# Patient Record
Sex: FEMALE | Race: WHITE | Employment: FULL TIME | URBAN - METROPOLITAN AREA
[De-identification: names, ages, dates, MRNs, and addresses within clinical notes are randomized per-mention and may not be internally consistent; named-entity substitution may affect disease eponyms.]

---

## 2022-08-22 ENCOUNTER — OFFICE VISIT (OUTPATIENT)
Dept: PRIMARY CARE CLINIC | Age: 47
End: 2022-08-22
Payer: MEDICAID

## 2022-08-22 VITALS
HEIGHT: 62 IN | DIASTOLIC BLOOD PRESSURE: 64 MMHG | WEIGHT: 193 LBS | TEMPERATURE: 97.1 F | SYSTOLIC BLOOD PRESSURE: 108 MMHG | OXYGEN SATURATION: 97 % | HEART RATE: 84 BPM | BODY MASS INDEX: 35.51 KG/M2

## 2022-08-22 DIAGNOSIS — G89.29 GROIN PAIN, CHRONIC, LEFT: ICD-10-CM

## 2022-08-22 DIAGNOSIS — Z76.89 ENCOUNTER TO ESTABLISH CARE: Primary | ICD-10-CM

## 2022-08-22 DIAGNOSIS — M47.26 OTHER SPONDYLOSIS WITH RADICULOPATHY, LUMBAR REGION: ICD-10-CM

## 2022-08-22 DIAGNOSIS — R10.32 GROIN PAIN, CHRONIC, LEFT: ICD-10-CM

## 2022-08-22 DIAGNOSIS — N95.1 MENOPAUSAL SYMPTOMS: ICD-10-CM

## 2022-08-22 DIAGNOSIS — G43.909 MIGRAINE WITHOUT STATUS MIGRAINOSUS, NOT INTRACTABLE, UNSPECIFIED MIGRAINE TYPE: ICD-10-CM

## 2022-08-22 DIAGNOSIS — Z11.59 ENCOUNTER FOR HEPATITIS C SCREENING TEST FOR LOW RISK PATIENT: ICD-10-CM

## 2022-08-22 DIAGNOSIS — Z13.220 LIPID SCREENING: ICD-10-CM

## 2022-08-22 PROCEDURE — 99204 OFFICE O/P NEW MOD 45 MIN: CPT | Performed by: INTERNAL MEDICINE

## 2022-08-22 SDOH — ECONOMIC STABILITY: FOOD INSECURITY: WITHIN THE PAST 12 MONTHS, YOU WORRIED THAT YOUR FOOD WOULD RUN OUT BEFORE YOU GOT MONEY TO BUY MORE.: NEVER TRUE

## 2022-08-22 SDOH — ECONOMIC STABILITY: FOOD INSECURITY: WITHIN THE PAST 12 MONTHS, THE FOOD YOU BOUGHT JUST DIDN'T LAST AND YOU DIDN'T HAVE MONEY TO GET MORE.: NEVER TRUE

## 2022-08-22 ASSESSMENT — PATIENT HEALTH QUESTIONNAIRE - PHQ9
DEPRESSION UNABLE TO ASSESS: PT REFUSES
2. FEELING DOWN, DEPRESSED OR HOPELESS: 0
1. LITTLE INTEREST OR PLEASURE IN DOING THINGS: 0
SUM OF ALL RESPONSES TO PHQ9 QUESTIONS 1 & 2: 0
SUM OF ALL RESPONSES TO PHQ QUESTIONS 1-9: 0

## 2022-08-22 ASSESSMENT — ENCOUNTER SYMPTOMS
SHORTNESS OF BREATH: 0
CHEST TIGHTNESS: 0
COUGH: 0
WHEEZING: 0
NAUSEA: 0
VOMITING: 0

## 2022-08-22 ASSESSMENT — SOCIAL DETERMINANTS OF HEALTH (SDOH): HOW HARD IS IT FOR YOU TO PAY FOR THE VERY BASICS LIKE FOOD, HOUSING, MEDICAL CARE, AND HEATING?: NOT HARD AT ALL
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
## 2022-09-20 DIAGNOSIS — N95.1 MENOPAUSAL SYMPTOMS: ICD-10-CM

## 2022-09-20 DIAGNOSIS — Z11.59 ENCOUNTER FOR HEPATITIS C SCREENING TEST FOR LOW RISK PATIENT: ICD-10-CM

## 2022-09-20 DIAGNOSIS — Z76.89 ENCOUNTER TO ESTABLISH CARE: ICD-10-CM

## 2022-09-20 DIAGNOSIS — Z13.220 LIPID SCREENING: ICD-10-CM

## 2022-09-20 LAB
ALBUMIN SERPL-MCNC: 4.3 G/DL (ref 3.5–4.6)
ALP BLD-CCNC: 70 U/L (ref 40–130)
ALT SERPL-CCNC: 14 U/L (ref 0–33)
ANION GAP SERPL CALCULATED.3IONS-SCNC: 9 MEQ/L (ref 9–15)
AST SERPL-CCNC: 14 U/L (ref 0–35)
BASOPHILS ABSOLUTE: 0 K/UL (ref 0–0.2)
BASOPHILS RELATIVE PERCENT: 0.5 %
BILIRUB SERPL-MCNC: <0.2 MG/DL (ref 0.2–0.7)
BUN BLDV-MCNC: 19 MG/DL (ref 6–20)
CALCIUM SERPL-MCNC: 8.9 MG/DL (ref 8.5–9.9)
CHLORIDE BLD-SCNC: 107 MEQ/L (ref 95–107)
CHOLESTEROL, TOTAL: 211 MG/DL (ref 0–199)
CO2: 26 MEQ/L (ref 20–31)
CREAT SERPL-MCNC: 0.81 MG/DL (ref 0.5–0.9)
EOSINOPHILS ABSOLUTE: 0.2 K/UL (ref 0–0.7)
EOSINOPHILS RELATIVE PERCENT: 2.7 %
FOLLICLE STIMULATING HORMONE: 84.7 MIU/ML (ref 1.7–21.5)
GFR AFRICAN AMERICAN: >60
GFR NON-AFRICAN AMERICAN: >60
GLOBULIN: 2.9 G/DL (ref 2.3–3.5)
GLUCOSE BLD-MCNC: 103 MG/DL (ref 70–99)
HCT VFR BLD CALC: 36.7 % (ref 37–47)
HDLC SERPL-MCNC: 46 MG/DL (ref 40–59)
HEMOGLOBIN: 12.3 G/DL (ref 12–16)
HEPATITIS C ANTIBODY: NONREACTIVE
LDL CHOLESTEROL CALCULATED: 134 MG/DL (ref 0–129)
LYMPHOCYTES ABSOLUTE: 2.5 K/UL (ref 1–4.8)
LYMPHOCYTES RELATIVE PERCENT: 28 %
MCH RBC QN AUTO: 30.5 PG (ref 27–31.3)
MCHC RBC AUTO-ENTMCNC: 33.5 % (ref 33–37)
MCV RBC AUTO: 91.1 FL (ref 82–100)
MONOCYTES ABSOLUTE: 0.5 K/UL (ref 0.2–0.8)
MONOCYTES RELATIVE PERCENT: 5.6 %
NEUTROPHILS ABSOLUTE: 5.5 K/UL (ref 1.4–6.5)
NEUTROPHILS RELATIVE PERCENT: 63.2 %
PDW BLD-RTO: 13.9 % (ref 11.5–14.5)
PLATELET # BLD: 231 K/UL (ref 130–400)
POTASSIUM SERPL-SCNC: 4.6 MEQ/L (ref 3.4–4.9)
RBC # BLD: 4.03 M/UL (ref 4.2–5.4)
SODIUM BLD-SCNC: 142 MEQ/L (ref 135–144)
TOTAL PROTEIN: 7.2 G/DL (ref 6.3–8)
TRIGL SERPL-MCNC: 154 MG/DL (ref 0–150)
WBC # BLD: 8.8 K/UL (ref 4.8–10.8)

## 2022-10-10 ENCOUNTER — TELEMEDICINE (OUTPATIENT)
Dept: PRIMARY CARE CLINIC | Age: 47
End: 2022-10-10
Payer: MEDICAID

## 2022-10-10 DIAGNOSIS — N95.1 VASOMOTOR SYMPTOMS DUE TO MENOPAUSE: Primary | ICD-10-CM

## 2022-10-10 PROCEDURE — 99423 OL DIG E/M SVC 21+ MIN: CPT | Performed by: INTERNAL MEDICINE

## 2022-10-10 RX ORDER — HYDROXYZINE PAMOATE 25 MG/1
25 CAPSULE ORAL 3 TIMES DAILY PRN
Qty: 80 CAPSULE | Refills: 2 | Status: SHIPPED | OUTPATIENT
Start: 2022-10-10 | End: 2022-11-09

## 2022-10-10 ASSESSMENT — ENCOUNTER SYMPTOMS
NAUSEA: 0
VOMITING: 0
SHORTNESS OF BREATH: 0
WHEEZING: 0
CHEST TIGHTNESS: 0
COUGH: 0

## 2022-10-10 NOTE — PROGRESS NOTES
Subjective:      Patient ID: Douglas Relily is a 50 y.o. female who presents today for:  Chief Complaint   Patient presents with    Discuss Medications     Estrogen Treatment        HPI    No past medical history on file. Past Surgical History:   Procedure Laterality Date     SECTION      PARTIAL HYSTERECTOMY (CERVIX NOT REMOVED)      TONSILLECTOMY       Family History   Problem Relation Age of Onset    Cancer Mother     Alcohol Abuse Father     High Cholesterol Father     High Blood Pressure Father     Heart Attack Father     Diabetes Father     Diabetes Brother     Diabetes Maternal Aunt     Diabetes Paternal Aunt      No Known Allergies  No current outpatient medications on file prior to visit. No current facility-administered medications on file prior to visit. Review of Systems    Objective: There were no vitals taken for this visit. Physical Exam    Assessment:      There are no diagnoses linked to this encounter. Health Maintenance   Topic Date Due    HIV screen  Never done    Cervical cancer screen  Never done    Diabetes screen  Never done    Colorectal Cancer Screen  Never done    Flu vaccine (1) 2022    Depression Screen  2023    Lipids  2027    DTaP/Tdap/Td vaccine (2 - Td or Tdap) 2028    Pneumococcal 0-64 years Vaccine  Completed    COVID-19 Vaccine  Completed    Hepatitis C screen  Completed    Hepatitis A vaccine  Aged Out    Hib vaccine  Aged Out    Meningococcal (ACWY) vaccine  Aged Out            Plan:      No orders of the defined types were placed in this encounter. No orders of the defined types were placed in this encounter. No follow-ups on file. Patient counseled on treatment rationale and possible medication adverse effects; verbalizes understanding and willing to proceed with care.     Patric Houston MD

## 2022-10-10 NOTE — PROGRESS NOTES
Subjective:      Patient ID: Sophie Adrian is a 50 y.o. female who presents today for:  Chief Complaint   Patient presents with    Discuss Medications     Estrogen Treatment        HPI  Patient being seen to discuss hormonal treatment for menopause. Previous visit on . Experiencing bothersome heat flashes, diffuse itching, weight gain and associated vasomotor symptoms. Of note, patient is s/p MEGAN for uterine fibroids at age 35; has one ovary in-situ (laterality not specified). Patient interested in hormonal treatment. States she was on estrogen some years ago for similar symptoms with good response. No personal history of breast or uterine cancer; no active liver disease. Denies history of blood clots or CVA. Family h/o of breast cancer in second degree relative. Patient is an active smoker, 1/4 pack/day. Counseled on risks associated with smoking and hormonal therapy. Understanding verbalized by patient, she will proceed with treatment. No past medical history on file. Past Surgical History:   Procedure Laterality Date     SECTION      PARTIAL HYSTERECTOMY (CERVIX NOT REMOVED)      TONSILLECTOMY       Family History   Problem Relation Age of Onset    Cancer Mother     Alcohol Abuse Father     High Cholesterol Father     High Blood Pressure Father     Heart Attack Father     Diabetes Father     Diabetes Brother     Diabetes Maternal Aunt     Diabetes Paternal Aunt      No Known Allergies  No current outpatient medications on file prior to visit. No current facility-administered medications on file prior to visit. Review of Systems   Constitutional:  Negative for activity change, chills, diaphoresis, fatigue and fever. Respiratory:  Negative for cough, chest tightness, shortness of breath and wheezing. Cardiovascular:  Negative for chest pain, palpitations and leg swelling. Gastrointestinal:  Negative for nausea and vomiting.    Neurological:  Negative for dizziness, syncope, light-headedness and headaches. Objective: There were no vitals taken for this visit. Physical Exam  N/A- Audio Encounter. Assessment:      Damaris Branham was seen today for discuss medications. Diagnoses and all orders for this visit:    Vasomotor symptoms due to menopause        -     Counseled at length on possible adverse effects and contraindications to HRT. Understanding verbalized, will proceed with treatment. -     estradiol (CLIMARA) 0.025 MG/24HR; Place 1 patch onto the skin every 7 days  -     hydrOXYzine pamoate (VISTARIL) 25 MG capsule; Take 1 capsule by mouth 3 times daily as needed for Itching or Anxiety      Health Maintenance   Topic Date Due    HIV screen  Never done    Cervical cancer screen  Never done    Diabetes screen  Never done    Colorectal Cancer Screen  Never done    Flu vaccine (1) 08/01/2022    Depression Screen  08/22/2023    Lipids  09/20/2027    DTaP/Tdap/Td vaccine (2 - Td or Tdap) 05/18/2028    Pneumococcal 0-64 years Vaccine  Completed    COVID-19 Vaccine  Completed    Hepatitis C screen  Completed    Hepatitis A vaccine  Aged Out    Hib vaccine  Aged Out    Meningococcal (ACWY) vaccine  Aged Out            Plan:    As above. No orders of the defined types were placed in this encounter. Orders Placed This Encounter   Medications    estradiol (CLIMARA) 0.025 MG/24HR     Sig: Place 1 patch onto the skin every 7 days     Dispense:  4 patch     Refill:  3    hydrOXYzine pamoate (VISTARIL) 25 MG capsule     Sig: Take 1 capsule by mouth 3 times daily as needed for Itching or Anxiety     Dispense:  80 capsule     Refill:  2       No follow-ups on file. On this date 10/10/2022 I have spent 30 minutes reviewing previous notes, test results and conducting a virtual visit with the patient discussing the diagnosis and importance of compliance with the treatment plan as well as documenting on the day of the visit.     Aman Allen was evaluated through a synchronous (real-time) audio encounter. The patient (or guardian if applicable) is aware that this is a billable service. Verbal consent to proceed has been obtained within the past 12 months. The visit was conducted pursuant to the emergency declaration under the [address] [place] [address] authority and the [employer] Act. Patient identification was verified, and a caregiver was present when appropriate. The patient was located in a state where the provider was credentialed to provide care.     Lisa Boateng MD

## 2023-10-12 ENCOUNTER — HOSPITAL ENCOUNTER (EMERGENCY)
Age: 48
Discharge: HOME | End: 2023-10-12

## 2023-10-12 ENCOUNTER — APPOINTMENT (OUTPATIENT)
Dept: CT IMAGING | Age: 48
End: 2023-10-12

## 2023-10-12 ENCOUNTER — APPOINTMENT (OUTPATIENT)
Dept: GENERAL RADIOLOGY | Age: 48
End: 2023-10-12

## 2023-10-12 VITALS
SYSTOLIC BLOOD PRESSURE: 111 MMHG | WEIGHT: 162 LBS | OXYGEN SATURATION: 99 % | HEIGHT: 61 IN | RESPIRATION RATE: 16 BRPM | DIASTOLIC BLOOD PRESSURE: 71 MMHG | TEMPERATURE: 97.8 F | BODY MASS INDEX: 30.58 KG/M2 | HEART RATE: 71 BPM

## 2023-10-12 DIAGNOSIS — R10.9 FLANK PAIN, ACUTE: Primary | ICD-10-CM

## 2023-10-12 LAB
ALBUMIN SERPL-MCNC: 4.6 G/DL (ref 3.5–4.6)
ALP SERPL-CCNC: 66 U/L (ref 40–130)
ALT SERPL-CCNC: 9 U/L (ref 0–33)
ANION GAP SERPL CALCULATED.3IONS-SCNC: 8 MEQ/L (ref 9–15)
AST SERPL-CCNC: 14 U/L (ref 0–35)
BACTERIA URNS QL MICRO: ABNORMAL /HPF
BASOPHILS # BLD: 0 K/UL (ref 0–0.2)
BASOPHILS NFR BLD: 0.4 %
BILIRUB SERPL-MCNC: 0.5 MG/DL (ref 0.2–0.7)
BILIRUB UR QL STRIP: NEGATIVE
BUN SERPL-MCNC: 17 MG/DL (ref 6–20)
CALCIUM SERPL-MCNC: 9.4 MG/DL (ref 8.5–9.9)
CHLORIDE SERPL-SCNC: 102 MEQ/L (ref 95–107)
CLARITY UR: ABNORMAL
CO2 SERPL-SCNC: 29 MEQ/L (ref 20–31)
COLOR UR: YELLOW
CREAT SERPL-MCNC: 0.84 MG/DL (ref 0.5–0.9)
EOSINOPHIL # BLD: 0.2 K/UL (ref 0–0.7)
EOSINOPHIL NFR BLD: 1.9 %
EPI CELLS #/AREA URNS AUTO: >100 /HPF (ref 0–5)
ERYTHROCYTE [DISTWIDTH] IN BLOOD BY AUTOMATED COUNT: 13.5 % (ref 11.5–14.5)
GLOBULIN SER CALC-MCNC: 2.7 G/DL (ref 2.3–3.5)
GLUCOSE SERPL-MCNC: 82 MG/DL (ref 70–99)
GLUCOSE UR STRIP-MCNC: NEGATIVE MG/DL
HCT VFR BLD AUTO: 41.2 % (ref 37–47)
HGB BLD-MCNC: 13.2 G/DL (ref 12–16)
HGB UR QL STRIP: NEGATIVE
HYALINE CASTS #/AREA URNS AUTO: ABNORMAL /HPF (ref 0–5)
KETONES UR STRIP-MCNC: NEGATIVE MG/DL
LACTATE BLDV-SCNC: 0.8 MMOL/L (ref 0.5–2.2)
LEUKOCYTE ESTERASE UR QL STRIP: ABNORMAL
LYMPHOCYTES # BLD: 3.7 K/UL (ref 1–4.8)
LYMPHOCYTES NFR BLD: 35.2 %
MCH RBC QN AUTO: 30.2 PG (ref 27–31.3)
MCHC RBC AUTO-ENTMCNC: 32 % (ref 33–37)
MCV RBC AUTO: 94.3 FL (ref 79.4–94.8)
MONOCYTES # BLD: 0.7 K/UL (ref 0.2–0.8)
MONOCYTES NFR BLD: 6.8 %
NEUTROPHILS # BLD: 5.8 K/UL (ref 1.4–6.5)
NEUTS SEG NFR BLD: 55.4 %
NITRITE UR QL STRIP: NEGATIVE
PH UR STRIP: 6.5 [PH] (ref 5–9)
PLATELET # BLD AUTO: 272 K/UL (ref 130–400)
POTASSIUM SERPL-SCNC: 4.3 MEQ/L (ref 3.4–4.9)
PROT SERPL-MCNC: 7.3 G/DL (ref 6.3–8)
PROT UR STRIP-MCNC: NEGATIVE MG/DL
RBC # BLD AUTO: 4.37 M/UL (ref 4.2–5.4)
RBC #/AREA URNS AUTO: ABNORMAL /HPF (ref 0–5)
SODIUM SERPL-SCNC: 139 MEQ/L (ref 135–144)
SP GR UR STRIP: 1.02 (ref 1–1.03)
URINE REFLEX TO CULTURE: ABNORMAL
UROBILINOGEN UR STRIP-ACNC: 0.2 E.U./DL
WBC # BLD AUTO: 10.5 K/UL (ref 4.8–10.8)
WBC #/AREA URNS AUTO: ABNORMAL /HPF (ref 0–5)

## 2023-10-12 PROCEDURE — 99284 EMERGENCY DEPT VISIT MOD MDM: CPT

## 2023-10-12 PROCEDURE — 36415 COLL VENOUS BLD VENIPUNCTURE: CPT

## 2023-10-12 PROCEDURE — 71046 X-RAY EXAM CHEST 2 VIEWS: CPT

## 2023-10-12 PROCEDURE — 85025 COMPLETE CBC W/AUTO DIFF WBC: CPT

## 2023-10-12 PROCEDURE — 2580000003 HC RX 258

## 2023-10-12 PROCEDURE — 74150 CT ABDOMEN W/O CONTRAST: CPT

## 2023-10-12 PROCEDURE — 80053 COMPREHEN METABOLIC PANEL: CPT

## 2023-10-12 PROCEDURE — 83605 ASSAY OF LACTIC ACID: CPT

## 2023-10-12 PROCEDURE — 81003 URINALYSIS AUTO W/O SCOPE: CPT

## 2023-10-12 PROCEDURE — 96374 THER/PROPH/DIAG INJ IV PUSH: CPT

## 2023-10-12 PROCEDURE — 6360000002 HC RX W HCPCS

## 2023-10-12 RX ORDER — TIZANIDINE 4 MG/1
4 TABLET ORAL EVERY 8 HOURS PRN
Qty: 15 TABLET | Refills: 0 | Status: SHIPPED | OUTPATIENT
Start: 2023-10-12

## 2023-10-12 RX ORDER — KETOROLAC TROMETHAMINE 15 MG/ML
30 INJECTION, SOLUTION INTRAMUSCULAR; INTRAVENOUS ONCE
Status: COMPLETED | OUTPATIENT
Start: 2023-10-12 | End: 2023-10-12

## 2023-10-12 RX ORDER — 0.9 % SODIUM CHLORIDE 0.9 %
1000 INTRAVENOUS SOLUTION INTRAVENOUS ONCE
Status: COMPLETED | OUTPATIENT
Start: 2023-10-12 | End: 2023-10-12

## 2023-10-12 RX ORDER — ETODOLAC 400 MG/1
400 TABLET, FILM COATED ORAL 2 TIMES DAILY
Qty: 14 TABLET | Refills: 0 | Status: SHIPPED | OUTPATIENT
Start: 2023-10-12

## 2023-10-12 RX ADMIN — KETOROLAC TROMETHAMINE 30 MG: 15 INJECTION, SOLUTION INTRAMUSCULAR; INTRAVENOUS at 17:26

## 2023-10-12 RX ADMIN — SODIUM CHLORIDE 1000 ML: 9 INJECTION, SOLUTION INTRAVENOUS at 17:25

## 2023-10-12 ASSESSMENT — PATIENT HEALTH QUESTIONNAIRE - PHQ9
SUM OF ALL RESPONSES TO PHQ9 QUESTIONS 1 & 2: 0
SUM OF ALL RESPONSES TO PHQ QUESTIONS 1-9: 0
2. FEELING DOWN, DEPRESSED OR HOPELESS: 0
SUM OF ALL RESPONSES TO PHQ QUESTIONS 1-9: 0
1. LITTLE INTEREST OR PLEASURE IN DOING THINGS: 0
SUM OF ALL RESPONSES TO PHQ QUESTIONS 1-9: 0
SUM OF ALL RESPONSES TO PHQ QUESTIONS 1-9: 0

## 2023-10-12 ASSESSMENT — PAIN - FUNCTIONAL ASSESSMENT
PAIN_FUNCTIONAL_ASSESSMENT: 0-10
PAIN_FUNCTIONAL_ASSESSMENT: ACTIVITIES ARE NOT PREVENTED
PAIN_FUNCTIONAL_ASSESSMENT: 0-10
PAIN_FUNCTIONAL_ASSESSMENT: NONE - DENIES PAIN

## 2023-10-12 ASSESSMENT — ENCOUNTER SYMPTOMS
CONSTIPATION: 0
NAUSEA: 0
SHORTNESS OF BREATH: 0
VOMITING: 0
ABDOMINAL PAIN: 0
COUGH: 0
DIARRHEA: 0
BACK PAIN: 1

## 2023-10-12 ASSESSMENT — PAIN DESCRIPTION - ONSET: ONSET: ON-GOING

## 2023-10-12 ASSESSMENT — LIFESTYLE VARIABLES
HOW OFTEN DO YOU HAVE A DRINK CONTAINING ALCOHOL: NEVER
HOW MANY STANDARD DRINKS CONTAINING ALCOHOL DO YOU HAVE ON A TYPICAL DAY: PATIENT DOES NOT DRINK

## 2023-10-12 ASSESSMENT — PAIN DESCRIPTION - FREQUENCY: FREQUENCY: INTERMITTENT

## 2023-10-12 ASSESSMENT — PAIN DESCRIPTION - PAIN TYPE: TYPE: ACUTE PAIN

## 2023-10-12 ASSESSMENT — PAIN DESCRIPTION - ORIENTATION: ORIENTATION: LEFT

## 2023-10-12 ASSESSMENT — PAIN DESCRIPTION - LOCATION: LOCATION: BACK

## 2023-10-12 ASSESSMENT — PAIN DESCRIPTION - DESCRIPTORS: DESCRIPTORS: ACHING;STABBING

## 2023-10-12 ASSESSMENT — PAIN SCALES - GENERAL
PAINLEVEL_OUTOF10: 3
PAINLEVEL_OUTOF10: 6

## 2023-10-12 NOTE — ED PROVIDER NOTES
[facility] ED  eMERGENCYdEPARTMENT eNCOUnter        Pt Name: Maia Boland  MRN: [id number]  [address] 1975[facility] evaluation: 10/12/2023  Rashad Herndon PA-C    CHIEF COMPLAINT       Chief Complaint   Patient presents with    Back Pain     Radiating to upper L ABD   Stabbing pain intermittent   Denies any SOB   Pt denies any cough          HISTORY OF PRESENT ILLNESS  (Location/Symptom, Timing/Onset, Context/Setting, Quality, Duration, Modifying Factors, Severity.)   Maia Boland is a 46 y.o. female who presents to the emergency department for evaluation of the left mid back pain that radiates to her left ribs. Patient states that the pain began 6 days ago and has been getting worse since then. She describes the pain as constant stabbing in her left mid back that intermittently \"jolts\" the sharp pain to her left lower ribs. She states that when the \" jolts\" have been the pain is a 7 out of 10. She has tried ibuprofen at home with only minimal improvement. Denies any rashes, fevers, chest pain, shortness of breath, abdominal pain, nausea, vomiting. HPI    Nursing Notes were reviewed and I agree. REVIEW OF SYSTEMS    (2-9 systems for level 4, 10 or more for level 5)     Review of Systems   Constitutional:  Negative for fever. HENT:  Negative for congestion. Respiratory:  Negative for cough and shortness of breath. Cardiovascular:  Negative for chest pain. Gastrointestinal:  Negative for abdominal pain, constipation, diarrhea, nausea and vomiting. Genitourinary:  Negative for dysuria. Musculoskeletal:  Positive for back pain. Skin:  Negative for rash and wound. as noted above the remainder of the review of systems was reviewed and negative. PAST MEDICAL HISTORY   No past medical history on file.       SURGICAL HISTORY       Past Surgical History:   Procedure Laterality Date     SECTION      PARTIAL HYSTERECTOMY (CERVIX NOT REMOVED)      TONSILLECTOMY

## 2023-10-12 NOTE — ED TRIAGE NOTES
Pt arrived with mid left back pain that was tender last Saturday  Pt states that stabbing pain that radiated to left upper abd/rib area  Stabbing pain comes and goes   Denies any SOB   Pt denies any cough   Pt denies any injury to area

## 2023-10-12 NOTE — ED NOTES
Patient resting comfortably with eyes closed. VSS. Respirations even and unlabored.      Paulette Beck RN  10/12/23 1948

## 2024-02-06 ENCOUNTER — APPOINTMENT (OUTPATIENT)
Dept: PRIMARY CARE | Facility: CLINIC | Age: 49
End: 2024-02-06
Payer: MEDICARE

## 2024-02-07 PROBLEM — E78.2 MIXED HYPERLIPIDEMIA: Status: ACTIVE | Noted: 2019-12-12

## 2024-02-07 PROBLEM — F41.1 GENERALIZED ANXIETY DISORDER: Status: ACTIVE | Noted: 2024-02-07

## 2024-02-13 ENCOUNTER — OFFICE VISIT (OUTPATIENT)
Dept: PRIMARY CARE | Facility: CLINIC | Age: 49
End: 2024-02-13
Payer: MEDICARE

## 2024-02-13 ENCOUNTER — APPOINTMENT (OUTPATIENT)
Dept: PRIMARY CARE | Facility: CLINIC | Age: 49
End: 2024-02-13
Payer: MEDICARE

## 2024-02-13 ENCOUNTER — HOSPITAL ENCOUNTER (OUTPATIENT)
Dept: RADIOLOGY | Facility: CLINIC | Age: 49
Discharge: HOME | End: 2024-02-13
Payer: MEDICARE

## 2024-02-13 VITALS
DIASTOLIC BLOOD PRESSURE: 60 MMHG | HEART RATE: 71 BPM | TEMPERATURE: 97.5 F | WEIGHT: 175 LBS | BODY MASS INDEX: 33.04 KG/M2 | OXYGEN SATURATION: 99 % | SYSTOLIC BLOOD PRESSURE: 116 MMHG | RESPIRATION RATE: 20 BRPM | HEIGHT: 61 IN

## 2024-02-13 DIAGNOSIS — G89.29 CHRONIC LEFT HIP PAIN: ICD-10-CM

## 2024-02-13 DIAGNOSIS — F17.200 TOBACCO USE DISORDER: ICD-10-CM

## 2024-02-13 DIAGNOSIS — M25.552 CHRONIC LEFT HIP PAIN: ICD-10-CM

## 2024-02-13 DIAGNOSIS — Z12.31 ENCOUNTER FOR SCREENING MAMMOGRAM FOR BREAST CANCER: ICD-10-CM

## 2024-02-13 DIAGNOSIS — E78.2 MIXED HYPERLIPIDEMIA: ICD-10-CM

## 2024-02-13 DIAGNOSIS — Z00.00 ANNUAL PHYSICAL EXAM: Primary | ICD-10-CM

## 2024-02-13 DIAGNOSIS — M62.838 MUSCLE SPASM: ICD-10-CM

## 2024-02-13 DIAGNOSIS — N95.1 MENOPAUSAL SYNDROME (HOT FLASHES): ICD-10-CM

## 2024-02-13 PROCEDURE — 4004F PT TOBACCO SCREEN RCVD TLK: CPT | Performed by: PHYSICIAN ASSISTANT

## 2024-02-13 PROCEDURE — 73502 X-RAY EXAM HIP UNI 2-3 VIEWS: CPT | Mod: LEFT SIDE | Performed by: RADIOLOGY

## 2024-02-13 PROCEDURE — 99386 PREV VISIT NEW AGE 40-64: CPT | Performed by: PHYSICIAN ASSISTANT

## 2024-02-13 PROCEDURE — 73502 X-RAY EXAM HIP UNI 2-3 VIEWS: CPT | Mod: LT

## 2024-02-13 RX ORDER — IBUPROFEN 200 MG
800 TABLET ORAL DAILY
COMMUNITY

## 2024-02-13 RX ORDER — ACETAMINOPHEN 500 MG
TABLET ORAL
COMMUNITY

## 2024-02-13 RX ORDER — GLUCOSAM/CHONDRO/HERB 149/HYAL 750-100 MG
TABLET ORAL DAILY
COMMUNITY

## 2024-02-13 RX ORDER — ESTRADIOL 1 MG/1
1 TABLET ORAL DAILY
Qty: 30 TABLET | Refills: 1 | Status: SHIPPED | OUTPATIENT
Start: 2024-02-13 | End: 2024-04-16

## 2024-02-13 RX ORDER — TIZANIDINE 4 MG/1
4 TABLET ORAL EVERY 8 HOURS PRN
Qty: 30 TABLET | Refills: 1 | Status: SHIPPED | OUTPATIENT
Start: 2024-02-13

## 2024-02-13 RX ORDER — MELOXICAM 15 MG/1
15 TABLET ORAL DAILY
Qty: 90 TABLET | Refills: 1 | Status: SHIPPED | OUTPATIENT
Start: 2024-02-13

## 2024-02-13 ASSESSMENT — ENCOUNTER SYMPTOMS: ARTHRALGIAS: 1

## 2024-02-13 NOTE — ASSESSMENT & PLAN NOTE
- Educated about risks of smoking e.g. COPD, cancer, MI   - Recommend smoking cessation   - Discussed tx options - pt declined   - Pt is trying to quit on her own and is tapering down. She has successfully quit in the past

## 2024-02-13 NOTE — ASSESSMENT & PLAN NOTE
PREVENTIVE CARE SCREENING:  - Labs: some due - ordered   - Cologuard/ Colonoscopy: UTD   Vaccines:   - Flu: UTD  - Shingles Vaccine (start at 50): DUE, DECLINED  - Tetanus (q10yrs): UTD  - COVID-19: Pfizer x 3 in 2021, DECLINED booster  Women's health:  - PAP: n/a hyst   - Mammogram: DUE, ordered today   Lifestyle Modification:  - Discussed DIET -   limit snacks, processed foods, sugary and greasy foods, fast foods. Increase healthy alternatives, whole grains, fruits vegetables.  - Encouraged to take daily multivitamin.    - Discussed EXERCISE -   Recommended weight training for bone health and 30 minutes of cardiovascular exercise 5-7 days a week.  - Encouraged pt to get yearly eye and dental exams
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
## 2024-02-15 ENCOUNTER — HOSPITAL ENCOUNTER (OUTPATIENT)
Dept: RADIOLOGY | Facility: CLINIC | Age: 49
Discharge: HOME | End: 2024-02-15
Payer: MEDICARE

## 2024-02-15 DIAGNOSIS — Z12.31 ENCOUNTER FOR SCREENING MAMMOGRAM FOR BREAST CANCER: ICD-10-CM

## 2024-02-15 DIAGNOSIS — M16.12 ARTHRITIS OF LEFT HIP: Primary | ICD-10-CM

## 2024-02-15 PROCEDURE — 77067 SCR MAMMO BI INCL CAD: CPT | Performed by: RADIOLOGY

## 2024-02-15 PROCEDURE — 77067 SCR MAMMO BI INCL CAD: CPT

## 2024-02-15 PROCEDURE — 77063 BREAST TOMOSYNTHESIS BI: CPT | Performed by: RADIOLOGY

## 2024-02-16 ENCOUNTER — HOSPITAL ENCOUNTER (OUTPATIENT)
Dept: RADIOLOGY | Facility: EXTERNAL LOCATION | Age: 49
Discharge: HOME | End: 2024-02-16

## 2024-02-26 ENCOUNTER — LAB (OUTPATIENT)
Dept: LAB | Facility: LAB | Age: 49
End: 2024-02-26
Payer: MEDICARE

## 2024-02-26 DIAGNOSIS — Z00.00 ANNUAL PHYSICAL EXAM: ICD-10-CM

## 2024-02-26 DIAGNOSIS — E78.2 MIXED HYPERLIPIDEMIA: ICD-10-CM

## 2024-02-26 LAB
CHOLEST SERPL-MCNC: 221 MG/DL (ref 0–199)
CHOLESTEROL/HDL RATIO: 3.9
EST. AVERAGE GLUCOSE BLD GHB EST-MCNC: 100 MG/DL
HBA1C MFR BLD: 5.1 %
HDLC SERPL-MCNC: 56 MG/DL
LDLC SERPL CALC-MCNC: 143 MG/DL
NON HDL CHOLESTEROL: 165 MG/DL (ref 0–149)
TRIGL SERPL-MCNC: 110 MG/DL (ref 0–149)
VLDL: 22 MG/DL (ref 0–40)

## 2024-02-26 PROCEDURE — 83036 HEMOGLOBIN GLYCOSYLATED A1C: CPT

## 2024-02-26 PROCEDURE — 80061 LIPID PANEL: CPT

## 2024-02-26 PROCEDURE — 36415 COLL VENOUS BLD VENIPUNCTURE: CPT

## 2024-02-27 ENCOUNTER — OFFICE VISIT (OUTPATIENT)
Dept: ORTHOPEDIC SURGERY | Facility: CLINIC | Age: 49
End: 2024-02-27
Payer: MEDICARE

## 2024-02-27 DIAGNOSIS — M16.12 ARTHRITIS OF LEFT HIP: ICD-10-CM

## 2024-02-27 PROCEDURE — 4004F PT TOBACCO SCREEN RCVD TLK: CPT | Performed by: ORTHOPAEDIC SURGERY

## 2024-02-27 PROCEDURE — 99203 OFFICE O/P NEW LOW 30 MIN: CPT | Performed by: ORTHOPAEDIC SURGERY

## 2024-02-27 ASSESSMENT — PAIN SCALES - GENERAL: PAINLEVEL_OUTOF10: 1

## 2024-02-27 ASSESSMENT — PAIN - FUNCTIONAL ASSESSMENT: PAIN_FUNCTIONAL_ASSESSMENT: 0-10

## 2024-02-27 NOTE — PROGRESS NOTES
History of Present Illness  No chief complaint on file.      Patient presents with side: left hip pain for several years.  It has been worsening over the past  3 months.  The patient localizes the pain predominantly in the groin.  Recently there has been concern for falls and instability.  There is increasing difficulty with activities of daily living and significant disability related to the hip pain.  The patient endorses the following failed non-operative treatments:  Mobic which is helping some .   There is increasing frustration with persistent pain and discomfort and decreasing distance of ambulation.    Pain is described as aching, throbbing and sharp.  Better with rest, worse with activity.   Pain level: 7  Assistive device: no device  History of surgery on the hip: None  Back pain: No  Numbness or tingling radiating to the lower extremities: No    No past medical history on file.    Medication Documentation Review Audit       Reviewed by Monse Clements PA-C (Physician Assistant) on 02/13/24 at 1425      Medication Order Taking? Sig Documenting Provider Last Dose Status   cholecalciferol (Vitamin D3) 5,000 Units tablet [id number] Yes Take by mouth 1 (one) time per week. Historical Provider, MD Taking Active   ibuprofen 200 mg tablet [id number] Yes Take 4 tablets (800 mg) by mouth once daily. Historical Provider, MD Taking Active   omega 3-dha-epa-fish oil (Fish OiL) 1,000 mg (120 mg-180 mg) capsule [id number] Yes Take by mouth once daily. Historical Provider, MD Taking Active   soy isofla/blk cohosh/mag bark (ESTROVEN ORAL) [id number] Yes Take by mouth once daily. Historical Provider, MD Taking Active                    No Known Allergies    Social History     Socioeconomic History    Marital status:      Spouse name: Not on file    Number of children: Not on file    Years of education: Not on file    Highest education level: Not on file   Occupational History    Not on file   Tobacco Use    Smoking  status: Every Day     Packs/day: 0.25     Years: 20.00     Additional pack years: 0.00     Total pack years: 5.00     Types: Cigarettes    Smokeless tobacco: Never   Vaping Use    Vaping Use: Never used   Substance and Sexual Activity    Alcohol use: Yes     Comment: rarely    Drug use: Never    Sexual activity: Not on file   Other Topics Concern    Not on file   Social History Narrative    Not on file     Social Determinants of Health     Financial Resource Strain: Not on file   Food Insecurity: Not on file   Transportation Needs: Not on file   Physical Activity: Not on file   Stress: Not on file   Social Connections: Not on file   Intimate Partner Violence: Not on file   Housing Stability: Not on file       Past Surgical History:   Procedure Laterality Date    HYSTERECTOMY      1 ovary left    TONSILLECTOMY      and adenoids       No images are attached to the encounter.    BMI  33       Review of Systems   GENERAL: Negative for malaise, significant weight loss, fever  MUSCULOSKELETAL: see HPI  NEURO:  Negative     Exam  side: left Hip:  Antalgic gait  Negative Trendelenburg sign  Skin healthy and intact  No tenderness to palpation over lumbar spine  Minimal tenderness over greater trochanter     Range of motion:  Flexion: 120 internal rotation: 20 external rotation: 30 abduction: 30  Pain with:  External rotation  No weakness with resisted hip flexion, abduction or adduction     Negative straight leg raise  Neurovascular exam normal distally     Radiographs  XR hip left with pelvis when performed 2 or 3 views  Status: Final result     PACS Images - [id number]     Show images for XR hip left with pelvis when performed 2 or 3 views  Signed by    Signed Time Phone Pager   José Luis Overton MD 2/14/2024 22:57 [phone]     Exam Information    Status Exam Begun Exam Ended   Final 2/13/2024 15:09 2/13/2024 15:14     Study Result    Narrative & Impression   Interpreted By:  José Luis Overton,   STUDY:  XR HIP LEFT WITH  PELVIS WHEN PERFORMED 2 OR 3 VIEWS; ;  2/13/2024  3:14 pm      INDICATION:  Signs/Symptoms:left hip pain.      COMPARISON:  None.      ACCESSION NUMBER(S):  [id number]      ORDERING CLINICIAN:  JESSA PERSAUD      FINDINGS:  No acute displaced fracture. Severe degenerative changes are noted of  the hip. No hip dislocation.      IMPRESSION:  Degenerative changes of the left hip without acute osseous  abnormality.          MACRO:  None      Signed by: José Luis Overton 2/14/2024 10:57 PM  Dictation workstation:   [id number]          Assessment  Osteoarthrosis side: left hip     Plan  We discussed with the patient the diagnosis of severe degenerative joint disease of the hip.  We reviewed an evidence-based approach to osteoarthritis of the hip.  We strongly encouraged low-impact aerobic activity and non-opioid analgesics.  We discussed the role of physical therapy to aid in strengthening and gait training.  We discussed temporary pain relief with corticosteroid injections and the associated risks.      The patient elected for continuing the Mobic and an intra-articular corticosteroid injection.     I will arrange for the injection and see her 6 weeks after the injection to see how this worked  All questions were answered

## 2024-03-07 ENCOUNTER — APPOINTMENT (OUTPATIENT)
Dept: ORTHOPEDIC SURGERY | Facility: CLINIC | Age: 49
End: 2024-03-07
Payer: MEDICARE

## 2024-04-10 ENCOUNTER — OFFICE VISIT (OUTPATIENT)
Dept: ORTHOPEDIC SURGERY | Facility: CLINIC | Age: 49
End: 2024-04-10
Payer: MEDICARE

## 2024-04-10 ENCOUNTER — HOSPITAL ENCOUNTER (OUTPATIENT)
Dept: RADIOLOGY | Facility: EXTERNAL LOCATION | Age: 49
Discharge: HOME | End: 2024-04-10

## 2024-04-10 DIAGNOSIS — M16.12 ARTHRITIS OF LEFT HIP: ICD-10-CM

## 2024-04-10 PROCEDURE — 99214 OFFICE O/P EST MOD 30 MIN: CPT | Performed by: FAMILY MEDICINE

## 2024-04-10 PROCEDURE — 99213 OFFICE O/P EST LOW 20 MIN: CPT | Performed by: FAMILY MEDICINE

## 2024-04-10 PROCEDURE — 2500000004 HC RX 250 GENERAL PHARMACY W/ HCPCS (ALT 636 FOR OP/ED): Performed by: FAMILY MEDICINE

## 2024-04-10 PROCEDURE — 2500000005 HC RX 250 GENERAL PHARMACY W/O HCPCS: Performed by: FAMILY MEDICINE

## 2024-04-10 PROCEDURE — 20611 DRAIN/INJ JOINT/BURSA W/US: CPT | Mod: LT | Performed by: FAMILY MEDICINE

## 2024-04-10 PROCEDURE — 4004F PT TOBACCO SCREEN RCVD TLK: CPT | Performed by: FAMILY MEDICINE

## 2024-04-10 RX ORDER — TRIAMCINOLONE ACETONIDE 40 MG/ML
40 INJECTION, SUSPENSION INTRA-ARTICULAR; INTRAMUSCULAR
Status: COMPLETED | OUTPATIENT
Start: 2024-04-10 | End: 2024-04-10

## 2024-04-10 RX ORDER — LIDOCAINE HYDROCHLORIDE 10 MG/ML
6 INJECTION INFILTRATION; PERINEURAL
Status: COMPLETED | OUTPATIENT
Start: 2024-04-10 | End: 2024-04-10

## 2024-04-10 RX ADMIN — LIDOCAINE HYDROCHLORIDE 6 ML: 10 INJECTION, SOLUTION INFILTRATION; PERINEURAL at 18:13

## 2024-04-10 RX ADMIN — TRIAMCINOLONE ACETONIDE 40 MG: 40 INJECTION, SUSPENSION INTRA-ARTICULAR; INTRAMUSCULAR at 18:13

## 2024-04-10 NOTE — PROGRESS NOTES
Acute Injury New Patient Visit    CC:   Chief Complaint   Patient presents with    Left Hip - Follow-up     Tj patient  Hip injection today       HPI: Nati is a 48 y.o.female who presents today with new complaints of left hip pain.  She has a history of osteoarthritis was recently seen by Dr. Amando Spencer and asked to follow-up with me for an ultrasound injection into her left hip.  She has not had any prior hip injections in the past.  She understands this is not a likely long-term solution and it is likely she is will need to undergo total left hip arthroplasty.        Review of Systems   GENERAL: Negative for malaise, significant weight loss, fever  MUSCULOSKELETAL: See HPI  NEURO: Negative for numbness / tingling     Past Medical History  History reviewed. No pertinent past medical history.    Medication review  Medication Documentation Review Audit       Reviewed by Patricia Shah MA (Medical Assistant) on 04/10/24 at 1358      Medication Order Taking? Sig Documenting Provider Last Dose Status   cholecalciferol (Vitamin D3) 5,000 Units tablet [id number] No Take by mouth 1 (one) time per week. Historical Provider, MD Taking Active   estradiol (Estrace) 1 mg tablet [id number]  Take 1 tablet (1 mg) by mouth once daily. Monse Clements PA-C  Active   ibuprofen 200 mg tablet [id number] No Take 4 tablets (800 mg) by mouth once daily. Historical Provider, MD Taking Active   meloxicam (Mobic) 15 mg tablet [id number]  Take 1 tablet (15 mg) by mouth once daily. Monse Clements PA-C  Active   omega 3-dha-epa-fish oil (Fish OiL) 1,000 mg (120 mg-180 mg) capsule [id number] No Take by mouth once daily. Historical Provider, MD Taking Active   soy isofla/blk cohosh/mag bark (ESTROVEN ORAL) [id number] No Take by mouth once daily. Historical Provider, MD Taking Active   tiZANidine (Zanaflex) 4 mg tablet [id number]  Take 1 tablet (4 mg) by mouth every 8 hours if needed for muscle spasms. Monse Clements PA-C  Active                     Allergies  No Known Allergies    Social History  Social History     Socioeconomic History    Marital status:      Spouse name: Not on file    Number of children: Not on file    Years of education: Not on file    Highest education level: Not on file   Occupational History    Not on file   Tobacco Use    Smoking status: Every Day     Current packs/day: 0.25     Average packs/day: 0.3 packs/day for 20.0 years (5.0 ttl pk-yrs)     Types: Cigarettes    Smokeless tobacco: Never   Vaping Use    Vaping status: Never Used   Substance and Sexual Activity    Alcohol use: Yes     Comment: rarely    Drug use: Never    Sexual activity: Not on file   Other Topics Concern    Not on file   Social History Narrative    Not on file     Social Determinants of Health     Financial Resource Strain: Low Risk  (12/13/2019)    Received from [facility]    Overall Financial Resource Strain (CARDIA)     Difficulty of Paying Living Expenses: Not very hard   Food Insecurity: No Food Insecurity (12/13/2019)    Received from [facility]    Hunger Vital Sign     Worried About Running Out of Food in the Last Year: Never true     Ran Out of Food in the Last Year: Never true   Transportation Needs: No Transportation Needs (12/13/2019)    Received from [facility]    PRAPARE - Transportation     Lack of Transportation (Medical): No     Lack of Transportation (Non-Medical): No   Physical Activity: Insufficiently Active (12/13/2019)    Received from [facility]    Exercise Vital Sign     Days of Exercise per Week: 2 days     Minutes of Exercise per Session: 10 min   Stress: No Stress Concern Present (12/13/2019)    Received from [facility]    [employer] [place] [employer] - Occupational Stress Questionnaire     Feeling of Stress : Only a little   Social Connections: Moderately Integrated (12/13/2019)    Received from [facility]    Social Connection and Isolation Panel [NHANES]     Frequency of  Communication with Friends and Family: More than three times a week     Frequency of Social Gatherings with Friends and Family: Once a week     Attends Mor[employer] Services: Never     Active Member of Clubs or Organizations: Yes     Attends Club or Organization Meetings: More than 4 times per year     Marital Status:    Intimate Partner Violence: Not on file   Housing Stability: Not on file       Surgical History  Past Surgical History:   Procedure Laterality Date    HYSTERECTOMY      1 ovary left    TONSILLECTOMY      and adenoids       Physical Exam:  GENERAL:  Patient is awake, alert, and oriented to person place and time.  Patient appears well nourished and well kept.  Affect Calm, Not Acutely Distressed.  HEENT:  Normocephalic, Atraumatic, EOMI  CARDIOVASCULAR:  Hemodynamically stable.  RESPIRATORY:  Normal respirations with unlabored breathing.  NEURO: Gross sensation intact to the lower extremities bilaterally.  Extremity: Left hip exam demonstrates no redness warmth or erythema pulses and sensation are intact.  No open cuts wounds or sores noted.  Full intact extensor mechanism normal muscle bulk and tone across the anterior aspect of the hip limited internal and external rotation with a negative logroll.      Diagnostics: No new images today        Procedure: US Guided left intra-articular Hip Joint Injection:    Before aspiration/injection, the risks  of this procedure including but not limited to;  infection, local skin irritation, skin atrophy, calcification, continued pain or discomfort, elevated blood sugar, burning, failure to relieve pain, possible late infection were all discussed with the patient.  The patient verbalized understanding and consented to the procedure.    After informed consent was provided, patient identification was confirmed, and allergies were verified, the patient was appropriately positioned. The site was marked and time-out performed.  The patient's [ Left /] Hip Joint was  evaluated via ultrasound in both the short and long axis to identify the intra-articular space.    The injection site was prepped in the usual sterile manner to provide a sterile environment. The skin was anesthetized with ethyl chloride spray. The [ aspiration/injection ] was performed with standard technique with the assistance of direct ultrasound guided visualization from an anterolateral approach. [7] cc´s of injectate consisting of [1] cc´s of [/ Kenalog ] and [6] cc´s of 1% lidocaine without epinephrine was instilled into the joint space.    The 22-guage spinal needle was withdrawn and the puncture site was secured with a Band-Aid. The patient tolerated the procedure well without complication.     Post-procedure discomfort can be alleviated with additional medication, ice, elevation, and rest over the first 24 hours as recommended.    L Inj/Asp: L hip joint on 4/10/2024 6:13 PM  Indications: pain  Details: 22 G needle, ultrasound-guided lateral approach  Medications: 40 mg triamcinolone acetonide 40 mg/mL; 6 mL lidocaine 10 mg/mL (1 %)  Outcome: tolerated well, no immediate complications  Procedure, treatment alternatives, risks and benefits explained, specific risks discussed. Consent was given by the patient. Immediately prior to procedure a time out was called to verify the correct patient, procedure, equipment, support staff and site/side marked as required. Patient was prepped and draped in the usual sterile fashion.           Assessment:   Problem List Items Addressed This Visit    None  Visit Diagnoses       Arthritis of left hip        Relevant Orders    Point of Care Ultrasound (Completed)             Plan: Patient tolerated the injection well.  Will have her follow-up with Dr. Amando Spencer in the next 6 weeks or so.  She is to call or return with any issues in the interim happy to see her back as needed for any potential repeat injection.  We discussed the possibility of 3 lifetime injections for the  arthritis in the left hip prior to requiring arthroplasty.  Orders Placed This Encounter    L Inj/Asp    Point of Care Ultrasound      At the conclusion of the visit there were no further questions by the patient/family regarding their plan of care.  Patient was instructed to call or return with any issues, questions, or concerns regarding their injury and/or treatment plan described above.     04/10/24 at 6:13 PM - Cole C Budinsky, MD    Office: [phone]    This note was prepared using voice recognition software.  The details of this note are correct and have been reviewed, and corrected to the best of my ability.  Some grammatical errors may persist related to the Dragon software.

## 2024-04-16 DIAGNOSIS — N95.1 MENOPAUSAL SYNDROME (HOT FLASHES): ICD-10-CM

## 2024-04-16 RX ORDER — ESTRADIOL 1 MG/1
1 TABLET ORAL DAILY
Qty: 30 TABLET | Refills: 1 | Status: SHIPPED | OUTPATIENT
Start: 2024-04-16

## 2024-05-21 ENCOUNTER — APPOINTMENT (OUTPATIENT)
Dept: ORTHOPEDIC SURGERY | Facility: CLINIC | Age: 49
End: 2024-05-21
Payer: MEDICARE

## 2024-05-29 ENCOUNTER — TELEPHONE (OUTPATIENT)
Dept: ORTHOPEDIC SURGERY | Facility: CLINIC | Age: 49
End: 2024-05-29
Payer: MEDICARE

## 2024-05-29 NOTE — TELEPHONE ENCOUNTER
Patient would like to know if Dr. Spencer can refer her back for another injection , she sees FS 6/4/24

## 2024-05-29 NOTE — LETTER
May 30, 2024     Patient: Nati Arauz   YOB: 1975   Date of Visit: 5/29/2024       To Whom It May Concern:    It is my medical opinion that Nati Arauz  is not able to work at this time.  She may return on 06-05-24  .    If you have any questions or concerns, please don't hesitate to call.         Sincerely,    Amando Spencer MD    CC: No Recipients

## 2024-05-30 NOTE — TELEPHONE ENCOUNTER
Pt calling again to ask for a note to be off work for the next 4 days. Pt stated that she cannot walk without a cane and is scheduled to work 12 hour shifts for the next 4 days. Pt stated that she is scheduled to go in this evening @ 5:45.

## 2024-05-30 NOTE — TELEPHONE ENCOUNTER
Patient was called and she was advised Dr. Spencer will discuss another injection at her appointment on 06-04-24.  We will give her a note to cover her off work from today and ending on 06-05-24.  Patient was advised she needs to keep her appointment pn 06-04-24, as we will not extend beyond 06-04-24 for off work.  She will pick this letter up at the [facility] office today.

## 2024-06-04 ENCOUNTER — OFFICE VISIT (OUTPATIENT)
Dept: ORTHOPEDIC SURGERY | Facility: CLINIC | Age: 49
End: 2024-06-04
Payer: MEDICARE

## 2024-06-04 DIAGNOSIS — M16.12 PRIMARY OSTEOARTHRITIS OF LEFT HIP: Primary | ICD-10-CM

## 2024-06-04 PROCEDURE — 99214 OFFICE O/P EST MOD 30 MIN: CPT | Performed by: ORTHOPAEDIC SURGERY

## 2024-06-04 NOTE — LETTER
June 4, 2024     Patient: Nait Arauz   YOB: 1975   Date of Visit: 6/4/2024       To Whom It May Concern:    It is my medical opinion that Nati Arauz  is to be on the following restrictions :  Limited walking, limited standing, no kneeling , crawling or climbing.  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 These restrictions are in effect through 07-17-24.  She will be undergoing a surgical procedure on 07-17-24. .    If you have any questions or concerns, please don't hesitate to call.         Sincerely,        Amando Spencer MD    CC: No Recipients

## 2024-06-04 NOTE — PROGRESS NOTES
"  No chief complaint on file.      The patient is here for follow-up of their side: left hip arthritis.  They complain of moderate hip pain.  Thus far the patient has tried Rest, ice, elevation, NSAIDS, and Injections.  The patient denies any recent trauma.  The patient denies any back pain, numbness or tingling in the lower extremity.    At the last visit she was sent for an ultrasound-guided intra-articular corticosteroid injection into the left hip.  She notes great relief from the injection however she did a lot of walking and stairs when she was in [place] and states she \"wore off the injection\" she states her pain is worse now than it was before the injection.  She had roughly 7-8 weeks of relief.    No past medical history on file.    Medication Documentation Review Audit       Reviewed by Patricia Shah MA (Medical Assistant) on 04/10/24 at 1358      Medication Order Taking? Sig Documenting Provider Last Dose Status   cholecalciferol (Vitamin D3) 5,000 Units tablet [id number] No Take by mouth 1 (one) time per week. Maria Elena Park MD Taking Active   estradiol (Estrace) 1 mg tablet [id number]  Take 1 tablet (1 mg) by mouth once daily. Monse Clements PA-C  Active   ibuprofen 200 mg tablet [id number] No Take 4 tablets (800 mg) by mouth once daily. Maria Elena Park MD Taking Active   meloxicam (Mobic) 15 mg tablet [id number]  Take 1 tablet (15 mg) by mouth once daily. Monse Clements PA-C  Active   omega 3-dha-epa-fish oil (Fish OiL) 1,000 mg (120 mg-180 mg) capsule [id number] No Take by mouth once daily. Maria Elena Park MD Taking Active   soy isofla/blk cohosh/mag bark (ESTROVEN ORAL) [id number] No Take by mouth once daily. Maria Elena Park MD Taking Active   tiZANidine (Zanaflex) 4 mg tablet [id number]  Take 1 tablet (4 mg) by mouth every 8 hours if needed for muscle spasms. Monse Clements PA-C  Active                    No Known Allergies    Social History     Socioeconomic History    Marital " status:      Spouse name: Not on file    Number of children: Not on file    Years of education: Not on file    Highest education level: Not on file   Occupational History    Not on file   Tobacco Use    Smoking status: Every Day     Current packs/day: 0.25     Average packs/day: 0.3 packs/day for 20.0 years (5.0 ttl pk-yrs)     Types: Cigarettes    Smokeless tobacco: Never   Vaping Use    Vaping status: Never Used   Substance and Sexual Activity    Alcohol use: Yes     Comment: rarely    Drug use: Never    Sexual activity: Not on file   Other Topics Concern    Not on file   Social History Narrative    Not on file     Social Determinants of Health     Financial Resource Strain: Low Risk  (12/13/2019)    Received from [facility]    Overall Financial Resource Strain (CARDIA)     Difficulty of Paying Living Expenses: Not very hard   Food Insecurity: No Food Insecurity (12/13/2019)    Received from [facility]    Hunger Vital Sign     Worried About Running Out of Food in the Last Year: Never true     Ran Out of Food in the Last Year: Never true   Transportation Needs: No Transportation Needs (12/13/2019)    Received from [facility]    PRAPARE - Transportation     Lack of Transportation (Medical): No     Lack of Transportation (Non-Medical): No   Physical Activity: Insufficiently Active (12/13/2019)    Received from [facility]    Exercise Vital Sign     Days of Exercise per Week: 2 days     Minutes of Exercise per Session: 10 min   Stress: No Stress Concern Present (12/13/2019)    Received from [facility]    [employer] [place] [employer] - Occupational Stress Questionnaire     Feeling of Stress : Only a little   Social Connections: Moderately Integrated (12/13/2019)    Received from [facility]    Social Connection and Isolation Panel [NHANES]     Frequency of Communication with Friends and Family: More than three times a week     Frequency of Social Gatherings with  Friends and Family: Once a week     Attends Congregation Services: Never     Active Member of Clubs or Organizations: Yes     Attends Club or Organization Meetings: More than 4 times per year     Marital Status:    Intimate Partner Violence: Not on file   Housing Stability: Not on file       Past Surgical History:   Procedure Laterality Date    HYSTERECTOMY      1 ovary left    TONSILLECTOMY      and adenoids       BMI  33    Physical examination side: left hip:    There is moderate pain with hip flexion, internal and external rotation.  The patient has intact hip flexion and hip abduction.  Hip range of motion:  Flexion: 120  Internal rotation: 20  External rotation: 30  Abduction: 30  The patient is distally neurovascularly intact    Imaging:  Left hip films from February 13, 2024 show severe degenerative arthrosis of the hip.  There is loss of joint space, subchondral sclerosis and spurring.      Impression:  Osteoarthrosis side: left hip    Plan:  The patient has failed to improve with multiple non-operative modalities.  There is increasing difficulty with activities of daily living and concern for falls.  The patient is endorsing severe pain and disability.       We had a lengthy discussion regarding total hip arthroplasty including the orthopaedic risks, including but not limited to, instability, infection, hematoma, early aseptic loosening, neurologic or vascular injury, clicking, limb length inequality and incomplete relief of pain.  We reviewed the medical risks, including but not limited to, deep venous thrombosis, pulmonary embolism, and cardiovascular/pulmonary issues.     We discussed the anticipated longevity of the implants and potential for revision surgery.  We also discussed anticoagulation, rehabilitation goals, and the hospital course.  We discussed the likelihood of opioid analgesics and risks associated with them.  The next step is to obtain medical risk stratification and encouraged the  pre-operative information seminar at the hospital.  We are happy to provide assistance and counseling if the patient has any additional concerns.

## 2024-06-12 ENCOUNTER — TELEPHONE (OUTPATIENT)
Dept: ORTHOPEDIC SURGERY | Facility: CLINIC | Age: 49
End: 2024-06-12
Payer: MEDICARE

## 2024-06-12 NOTE — TELEPHONE ENCOUNTER
Patient called wanting to verify that you received her paperwork. I verified that you did. She also gave me the fax number that once our section of the paperwork is filled out, she said it has to be faxed to [employer] in the HR department of her employer. The fax number is [phone].

## 2024-06-17 ENCOUNTER — TELEPHONE (OUTPATIENT)
Dept: ORTHOPEDIC SURGERY | Facility: CLINIC | Age: 49
End: 2024-06-17
Payer: MEDICARE

## 2024-06-17 PROBLEM — M16.12 UNILATERAL PRIMARY OSTEOARTHRITIS, LEFT HIP: Status: ACTIVE | Noted: 2024-06-14

## 2024-06-17 NOTE — TELEPHONE ENCOUNTER
6/17/24 - wheeled walker for surgery on 7/17/24 approved $80.00  **pt has mo crockett**  Coinsurance 50/50  Deductible 7500 / met 179.72  Max oop 9400 / met 362.09

## 2024-06-20 ENCOUNTER — TELEPHONE (OUTPATIENT)
Dept: ORTHOPEDIC SURGERY | Facility: CLINIC | Age: 49
End: 2024-06-20
Payer: MEDICARE

## 2024-06-20 NOTE — TELEPHONE ENCOUNTER
06/20/24  Spoke w/ pt re wheeled walker for post-op use.  She does have one and will take it to the hospital w/ her the day of sx.

## 2024-06-28 DIAGNOSIS — Z01.818 PREOP EXAMINATION: Primary | ICD-10-CM

## 2024-06-28 RX ORDER — CHLORHEXIDINE GLUCONATE ORAL RINSE 1.2 MG/ML
15 SOLUTION DENTAL AS NEEDED
Qty: 120 ML | Refills: 0 | Status: SHIPPED | OUTPATIENT
Start: 2024-06-28 | End: 2024-07-12

## 2024-07-03 ENCOUNTER — HOSPITAL ENCOUNTER (OUTPATIENT)
Dept: CARDIOLOGY | Facility: HOSPITAL | Age: 49
Discharge: HOME | End: 2024-07-03
Payer: MEDICARE

## 2024-07-03 ENCOUNTER — PRE-ADMISSION TESTING (OUTPATIENT)
Dept: PREADMISSION TESTING | Facility: HOSPITAL | Age: 49
End: 2024-07-03
Payer: MEDICARE

## 2024-07-03 VITALS
OXYGEN SATURATION: 100 % | SYSTOLIC BLOOD PRESSURE: 113 MMHG | DIASTOLIC BLOOD PRESSURE: 79 MMHG | BODY MASS INDEX: 33.71 KG/M2 | HEART RATE: 58 BPM | RESPIRATION RATE: 16 BRPM | WEIGHT: 178.57 LBS | HEIGHT: 61 IN

## 2024-07-03 DIAGNOSIS — M16.12 UNILATERAL PRIMARY OSTEOARTHRITIS, LEFT HIP: ICD-10-CM

## 2024-07-03 LAB
ALBUMIN SERPL BCP-MCNC: 4.1 G/DL (ref 3.4–5)
ALP SERPL-CCNC: 60 U/L (ref 33–110)
ALT SERPL W P-5'-P-CCNC: 9 U/L (ref 7–45)
ANION GAP SERPL CALC-SCNC: 9 MMOL/L (ref 10–20)
AST SERPL W P-5'-P-CCNC: 11 U/L (ref 9–39)
ATRIAL RATE: 58 BPM
BASOPHILS # BLD AUTO: 0.04 X10*3/UL (ref 0–0.1)
BASOPHILS NFR BLD AUTO: 0.5 %
BILIRUB SERPL-MCNC: 0.5 MG/DL (ref 0–1.2)
BUN SERPL-MCNC: 23 MG/DL (ref 6–23)
CALCIUM SERPL-MCNC: 8.9 MG/DL (ref 8.6–10.3)
CHLORIDE SERPL-SCNC: 108 MMOL/L (ref 98–107)
CO2 SERPL-SCNC: 27 MMOL/L (ref 21–32)
CREAT SERPL-MCNC: 0.84 MG/DL (ref 0.5–1.05)
EGFRCR SERPLBLD CKD-EPI 2021: 84 ML/MIN/1.73M*2
EOSINOPHIL # BLD AUTO: 0.29 X10*3/UL (ref 0–0.7)
EOSINOPHIL NFR BLD AUTO: 3.9 %
ERYTHROCYTE [DISTWIDTH] IN BLOOD BY AUTOMATED COUNT: 13.9 % (ref 11.5–14.5)
EST. AVERAGE GLUCOSE BLD GHB EST-MCNC: 114 MG/DL
GLUCOSE SERPL-MCNC: 92 MG/DL (ref 74–99)
HBA1C MFR BLD: 5.6 %
HCT VFR BLD AUTO: 38.8 % (ref 36–46)
HGB BLD-MCNC: 12.8 G/DL (ref 12–16)
IMM GRANULOCYTES # BLD AUTO: 0.03 X10*3/UL (ref 0–0.7)
IMM GRANULOCYTES NFR BLD AUTO: 0.4 % (ref 0–0.9)
INR PPP: 0.9 (ref 0.9–1.1)
LYMPHOCYTES # BLD AUTO: 2.86 X10*3/UL (ref 1.2–4.8)
LYMPHOCYTES NFR BLD AUTO: 38.5 %
MCH RBC QN AUTO: 30.2 PG (ref 26–34)
MCHC RBC AUTO-ENTMCNC: 33 G/DL (ref 32–36)
MCV RBC AUTO: 92 FL (ref 80–100)
MONOCYTES # BLD AUTO: 0.39 X10*3/UL (ref 0.1–1)
MONOCYTES NFR BLD AUTO: 5.3 %
NEUTROPHILS # BLD AUTO: 3.81 X10*3/UL (ref 1.2–7.7)
NEUTROPHILS NFR BLD AUTO: 51.4 %
NRBC BLD-RTO: 0 /100 WBCS (ref 0–0)
P AXIS: -28 DEGREES
P OFFSET: 207 MS
P ONSET: 164 MS
PLATELET # BLD AUTO: 225 X10*3/UL (ref 150–450)
POTASSIUM SERPL-SCNC: 4.4 MMOL/L (ref 3.5–5.3)
PR INTERVAL: 110 MS
PROT SERPL-MCNC: 6.6 G/DL (ref 6.4–8.2)
PROTHROMBIN TIME: 10.5 SECONDS (ref 9.8–12.8)
Q ONSET: 219 MS
QRS COUNT: 9 BEATS
QRS DURATION: 82 MS
QT INTERVAL: 440 MS
QTC CALCULATION(BAZETT): 431 MS
QTC FREDERICIA: 434 MS
R AXIS: 6 DEGREES
RBC # BLD AUTO: 4.24 X10*6/UL (ref 4–5.2)
SODIUM SERPL-SCNC: 140 MMOL/L (ref 136–145)
T AXIS: 30 DEGREES
T OFFSET: 439 MS
VENTRICULAR RATE: 58 BPM
WBC # BLD AUTO: 7.4 X10*3/UL (ref 4.4–11.3)

## 2024-07-03 PROCEDURE — 84075 ASSAY ALKALINE PHOSPHATASE: CPT

## 2024-07-03 PROCEDURE — 36415 COLL VENOUS BLD VENIPUNCTURE: CPT

## 2024-07-03 PROCEDURE — 85025 COMPLETE CBC W/AUTO DIFF WBC: CPT

## 2024-07-03 PROCEDURE — 85610 PROTHROMBIN TIME: CPT

## 2024-07-03 PROCEDURE — 83036 HEMOGLOBIN GLYCOSYLATED A1C: CPT | Mod: ELYLAB

## 2024-07-03 PROCEDURE — 93005 ELECTROCARDIOGRAM TRACING: CPT

## 2024-07-03 PROCEDURE — 87081 CULTURE SCREEN ONLY: CPT | Mod: ELYLAB

## 2024-07-03 NOTE — PREPROCEDURE INSTRUCTIONS
Pre-op instructions reviewed with patient including NPO status and where to check in for procedure.  Provided with and Discussed/Instructed on [facility] Soap and Hip Surgery Info Book. Instructed on [facility] Oral Rinse.

## 2024-07-05 LAB — STAPHYLOCOCCUS SPEC CULT: ABNORMAL

## 2024-07-09 DIAGNOSIS — Z22.322 MRSA (METHICILLIN RESISTANT STAPH AUREUS) CULTURE POSITIVE: Primary | ICD-10-CM

## 2024-07-09 RX ORDER — MUPIROCIN 20 MG/G
OINTMENT TOPICAL
Qty: 15 G | Refills: 0 | Status: SHIPPED | OUTPATIENT
Start: 2024-07-09 | End: 2024-07-19

## 2024-07-10 ENCOUNTER — APPOINTMENT (OUTPATIENT)
Dept: PRIMARY CARE | Facility: CLINIC | Age: 49
End: 2024-07-10
Payer: MEDICARE

## 2024-07-10 VITALS
BODY MASS INDEX: 34.59 KG/M2 | OXYGEN SATURATION: 98 % | RESPIRATION RATE: 16 BRPM | WEIGHT: 183.2 LBS | SYSTOLIC BLOOD PRESSURE: 100 MMHG | TEMPERATURE: 96 F | HEIGHT: 61 IN | HEART RATE: 64 BPM | DIASTOLIC BLOOD PRESSURE: 52 MMHG

## 2024-07-10 DIAGNOSIS — M16.12 UNILATERAL PRIMARY OSTEOARTHRITIS, LEFT HIP: Primary | ICD-10-CM

## 2024-07-10 DIAGNOSIS — M62.838 MUSCLE SPASM: ICD-10-CM

## 2024-07-10 PROCEDURE — 99212 OFFICE O/P EST SF 10 MIN: CPT | Performed by: PHYSICIAN ASSISTANT

## 2024-07-10 PROCEDURE — 4004F PT TOBACCO SCREEN RCVD TLK: CPT | Performed by: PHYSICIAN ASSISTANT

## 2024-07-10 RX ORDER — TIZANIDINE 4 MG/1
4 TABLET ORAL EVERY 8 HOURS PRN
Qty: 30 TABLET | Refills: 1 | Status: SHIPPED | OUTPATIENT
Start: 2024-07-10

## 2024-07-10 NOTE — H&P (VIEW-ONLY)
Chief Complaint   Patient presents with    Pre-op Clearance     Left hip arthroplasty with Dr. Spencer on 7/17   PAT testing was on 7/3        Nati  is a 49 y.o. with left hip arthritis for pre-op exam prior to hip replacement with Dr. Spencer on 7/17    PT'S MEDICAL HISTORY:  - Denies current infection  - Denies h/o lung disease - asthma, COPD   - Denies h/o anemia   - Denies known CAD, h/o MI, HTN   - Denies sleep apnea   - Denies thyroid disease   - Denies bleeding disorder or blood clotting disorder easy burising, easy brusing   - Denies h/o prolonged steroid use    PAST SURGICAL HISTORY:  Past Surgical History:   Procedure Laterality Date    COLONOSCOPY      DILATION AND CURETTAGE OF UTERUS      ECTOPIC PREGNANCY SURGERY      HYSTERECTOMY      1 ovary left    TONSILLECTOMY      and adenoids    WISDOM TOOTH EXTRACTION        - Pt denies complications to these surgeries   - Pt denies issues with the anesthesia      MEDICATIONS:    Current Outpatient Medications:     chlorhexidine (Peridex) 0.12 % solution, Use 15 mL in the mouth or throat if needed for wound care for up to 14 days. Use the night before and morning of surgery, Disp: 120 mL, Rfl: 0    ibuprofen 200 mg tablet, Take 4 tablets (800 mg) by mouth once daily., Disp: , Rfl:     meloxicam (Mobic) 15 mg tablet, Take 1 tablet (15 mg) by mouth once daily., Disp: 90 tablet, Rfl: 1    mupirocin (Bactroban) 2 % ointment, Apply topically 3 times a day for 10 days. Apply small amount in each nostril 3 times a day for the next 10 days, Disp: 15 g, Rfl: 0    estradiol (Estrace) 1 mg tablet, TAKE 1 TABLET BY MOUTH ONCE DAILY. (Patient not taking: Reported on 7/3/2024), Disp: 30 tablet, Rfl: 1    tiZANidine (Zanaflex) 4 mg tablet, Take 1 tablet (4 mg) by mouth every 8 hours if needed for muscle spasms., Disp: 30 tablet, Rfl: 1   - Will need to hold NSAIDs 1 week prior to surgery (meloxicam, Naproxen)    ALLERGIES:  No Known Allergies      SOCIAL HISTORY:  Tobacco -  smokes 1/2 ppd to 1/3 ppd   Alcohol - very rare occasional   Other substance use - no       FAMILY HISTORY:     Family History   Problem Relation Name Age of Onset    Lung cancer Mother      Brain cancer Mother      Bone cancer Mother      Heart disease Father      Heart attack Father      COPD Father      Other (smoker) Father      Diabetes Father      Other (esophageal problem) Father      Other (heart stent) Father      Alcohol abuse Father      Drug abuse Father      Rheum arthritis Maternal Grandmother      Other (blood cancer) Maternal Grandfather      Diabetes Paternal Grandmother      Other () Paternal Grandfather      Alcohol abuse Paternal Grandfather       REVISED CARDIAC RISK INDEX  - Elevated-risk surgery (Intraperitoneal; intrathoracic; suprainguinal vascular): no   - History of ischemic heart disease (History of myocardial infarction (MI); history of positive exercise test; current chest pain considered due to myocardial ischemia; use of nitrate therapy or ECG with pathological Q waves): no  - History of congestive heart failure (Pulmonary edema, bilateral rales or S3 gallop; paroxysmal nocturnal dyspnea; chest x-ray (CXR) showing pulmonary vascular redistribution): no  History of cerebrovascular disease (Prior transient ischemic attack (TIA) or stroke): no  - Pre-operative treatment with insulin: no  - Pre-operative creatinine >2 mg/dL / 176.8 µmol/L: no  Total Score is: 0    STOP-BANG SCORE  Do you snore loudly? (Louder than talking or loud enough to be heard through closed doors): no   Do you often feel tired, fatigued, or sleepy during the daytime?: yes (not sleeping well due to her hip pain)   Has anyone observed you stop breathing during sleep?: no   Do you have (or are you being treated for) high blood pressure?: no  Objective measures:  BMI > 35kg/m2: no  Age > 50 years: yes   Neck circumference > 40: no  Male gender: no  Total Scroe: 2 which correlates with low  risk of moderate to  severe GIULIANA     Physical Exam  Constitutional:       General: She is not in acute distress.     Appearance: She is obese.      Comments: Here with her  Zeke today    HENT:      Head: Normocephalic.      Right Ear: Tympanic membrane and ear canal normal.      Left Ear: Tympanic membrane and ear canal normal.      Nose: Nose normal.      Mouth/Throat:      Mouth: Mucous membranes are moist.      Pharynx: Oropharynx is clear.   Eyes:      Extraocular Movements: Extraocular movements intact.      Conjunctiva/sclera: Conjunctivae normal.      Pupils: Pupils are equal, round, and reactive to light.   Cardiovascular:      Rate and Rhythm: Normal rate and regular rhythm.      Pulses: Normal pulses.      Heart sounds: No murmur heard.  Pulmonary:      Effort: Pulmonary effort is normal.      Breath sounds: Normal breath sounds. No wheezing, rhonchi or rales.   Abdominal:      General: Bowel sounds are normal. There is no distension.      Palpations: Abdomen is soft. There is no mass.      Tenderness: There is no abdominal tenderness. There is no guarding.   Musculoskeletal:         General: Normal range of motion.      Cervical back: Neck supple.      Comments: Antalgic gait   Walks with cane short distances, wheelchair longer distances   Lymphadenopathy:      Cervical: No cervical adenopathy.   Skin:     General: Skin is warm and dry.      Findings: No lesion or rash.   Neurological:      General: No focal deficit present.      Mental Status: She is alert.      Gait: Gait normal.   Psychiatric:         Mood and Affect: Mood normal.          ASSESSMENT AND PLAN:  Nati Perla is medically optimized/ CLEAR for the planned surgery     1.  CARDIAC EVALUATION   - Revised cardiac risk index sore is 0. Risk class I. 3.9% thirty day risk of death, MI or cardiac arrest     2.  PULMONARY EVALUATION  -  No asthma or COPD   - she  does not have dx of GIULIANA. her  STOP BANG score is 2 - low risk of moderate to severe  GIULIANA   - She is a cigarette smoker. Encouraged smoking cessation prior to and after surgery for better healing.     3.  HEMATOLOGIC EVALUATION  -  No history of anemia   -  Bleeding risk is low - no h/o bleeding disorder - denies easy bruising, frequent nosebleeds.   -  No h/o blood clot or clotting disorder   -  Pt was encouraged to stop NSAIDs at least 1 week prior to surgery     4.  ENDOCRINE EVALUATION  -  No h/o diabetes  -  No recent prolonged steroid use or other know risk factor for adrenal insufficiency.     5.  RENAL EVALUATION  -  No CKD  -  No risks identified     6. GI EVALUATION  -  No history of liver disease   -  No history of alcohol abuse   -  No risks identified

## 2024-07-11 LAB
ATRIAL RATE: 58 BPM
P AXIS: -28 DEGREES
P OFFSET: 207 MS
P ONSET: 164 MS
PR INTERVAL: 110 MS
Q ONSET: 219 MS
QRS COUNT: 9 BEATS
QRS DURATION: 82 MS
QT INTERVAL: 440 MS
QTC CALCULATION(BAZETT): 431 MS
QTC FREDERICIA: 434 MS
R AXIS: 6 DEGREES
T AXIS: 30 DEGREES
T OFFSET: 439 MS
VENTRICULAR RATE: 58 BPM

## 2024-07-17 ENCOUNTER — APPOINTMENT (OUTPATIENT)
Dept: RADIOLOGY | Facility: HOSPITAL | Age: 49
End: 2024-07-17
Payer: MEDICARE

## 2024-07-17 ENCOUNTER — ANESTHESIA (OUTPATIENT)
Dept: OPERATING ROOM | Facility: HOSPITAL | Age: 49
End: 2024-07-17
Payer: MEDICARE

## 2024-07-17 ENCOUNTER — HOME HEALTH ADMISSION (OUTPATIENT)
Dept: HOME HEALTH SERVICES | Facility: HOME HEALTH | Age: 49
End: 2024-07-17
Payer: MEDICARE

## 2024-07-17 ENCOUNTER — HOSPITAL ENCOUNTER (OUTPATIENT)
Facility: HOSPITAL | Age: 49
Discharge: HOME | End: 2024-07-18
Attending: ORTHOPAEDIC SURGERY | Admitting: ORTHOPAEDIC SURGERY
Payer: MEDICARE

## 2024-07-17 ENCOUNTER — ANESTHESIA EVENT (OUTPATIENT)
Dept: OPERATING ROOM | Facility: HOSPITAL | Age: 49
End: 2024-07-17
Payer: MEDICARE

## 2024-07-17 DIAGNOSIS — M16.12 UNILATERAL PRIMARY OSTEOARTHRITIS, LEFT HIP: Primary | ICD-10-CM

## 2024-07-17 PROCEDURE — 7100000002 HC RECOVERY ROOM TIME - EACH INCREMENTAL 1 MINUTE: Performed by: ORTHOPAEDIC SURGERY

## 2024-07-17 PROCEDURE — 2500000004 HC RX 250 GENERAL PHARMACY W/ HCPCS (ALT 636 FOR OP/ED): Mod: JZ | Performed by: PHYSICIAN ASSISTANT

## 2024-07-17 PROCEDURE — 2500000004 HC RX 250 GENERAL PHARMACY W/ HCPCS (ALT 636 FOR OP/ED): Performed by: PHYSICIAN ASSISTANT

## 2024-07-17 PROCEDURE — 2500000002 HC RX 250 W HCPCS SELF ADMINISTERED DRUGS (ALT 637 FOR MEDICARE OP, ALT 636 FOR OP/ED): Performed by: ORTHOPAEDIC SURGERY

## 2024-07-17 PROCEDURE — 27130 TOTAL HIP ARTHROPLASTY: CPT | Performed by: PHYSICIAN ASSISTANT

## 2024-07-17 PROCEDURE — 27130 TOTAL HIP ARTHROPLASTY: CPT | Performed by: ORTHOPAEDIC SURGERY

## 2024-07-17 PROCEDURE — 7100000011 HC EXTENDED STAY RECOVERY HOURLY - NURSING UNIT

## 2024-07-17 PROCEDURE — 2500000004 HC RX 250 GENERAL PHARMACY W/ HCPCS (ALT 636 FOR OP/ED): Mod: JZ | Performed by: ORTHOPAEDIC SURGERY

## 2024-07-17 PROCEDURE — 97110 THERAPEUTIC EXERCISES: CPT | Mod: GP | Performed by: PHYSICAL THERAPIST

## 2024-07-17 PROCEDURE — 3700000001 HC GENERAL ANESTHESIA TIME - INITIAL BASE CHARGE: Performed by: ORTHOPAEDIC SURGERY

## 2024-07-17 PROCEDURE — 3700000002 HC GENERAL ANESTHESIA TIME - EACH INCREMENTAL 1 MINUTE: Performed by: ORTHOPAEDIC SURGERY

## 2024-07-17 PROCEDURE — 2500000005 HC RX 250 GENERAL PHARMACY W/O HCPCS: Performed by: ANESTHESIOLOGY

## 2024-07-17 PROCEDURE — 2780000003 HC OR 278 NO HCPCS: Performed by: ORTHOPAEDIC SURGERY

## 2024-07-17 PROCEDURE — 72170 X-RAY EXAM OF PELVIS: CPT

## 2024-07-17 PROCEDURE — 2500000002 HC RX 250 W HCPCS SELF ADMINISTERED DRUGS (ALT 637 FOR MEDICARE OP, ALT 636 FOR OP/ED): Performed by: PHYSICIAN ASSISTANT

## 2024-07-17 PROCEDURE — 7100000001 HC RECOVERY ROOM TIME - INITIAL BASE CHARGE: Performed by: ORTHOPAEDIC SURGERY

## 2024-07-17 PROCEDURE — 3600000018 HC OR TIME - INITIAL BASE CHARGE - PROCEDURE LEVEL SIX: Performed by: ORTHOPAEDIC SURGERY

## 2024-07-17 PROCEDURE — 97161 PT EVAL LOW COMPLEX 20 MIN: CPT | Mod: GP | Performed by: PHYSICAL THERAPIST

## 2024-07-17 PROCEDURE — C1776 JOINT DEVICE (IMPLANTABLE): HCPCS | Performed by: ORTHOPAEDIC SURGERY

## 2024-07-17 PROCEDURE — 2500000004 HC RX 250 GENERAL PHARMACY W/ HCPCS (ALT 636 FOR OP/ED): Performed by: ANESTHESIOLOGY

## 2024-07-17 PROCEDURE — 2500000005 HC RX 250 GENERAL PHARMACY W/O HCPCS: Performed by: ORTHOPAEDIC SURGERY

## 2024-07-17 PROCEDURE — 2500000001 HC RX 250 WO HCPCS SELF ADMINISTERED DRUGS (ALT 637 FOR MEDICARE OP): Performed by: ORTHOPAEDIC SURGERY

## 2024-07-17 PROCEDURE — 2500000005 HC RX 250 GENERAL PHARMACY W/O HCPCS: Performed by: PHYSICIAN ASSISTANT

## 2024-07-17 PROCEDURE — 2720000007 HC OR 272 NO HCPCS: Performed by: ORTHOPAEDIC SURGERY

## 2024-07-17 PROCEDURE — C1713 ANCHOR/SCREW BN/BN,TIS/BN: HCPCS | Performed by: ORTHOPAEDIC SURGERY

## 2024-07-17 PROCEDURE — 2500000001 HC RX 250 WO HCPCS SELF ADMINISTERED DRUGS (ALT 637 FOR MEDICARE OP): Performed by: PHYSICIAN ASSISTANT

## 2024-07-17 PROCEDURE — 3600000017 HC OR TIME - EACH INCREMENTAL 1 MINUTE - PROCEDURE LEVEL SIX: Performed by: ORTHOPAEDIC SURGERY

## 2024-07-17 DEVICE — CERAMIC V40 FEMORAL HEAD
Type: IMPLANTABLE DEVICE | Site: HIP | Status: FUNCTIONAL
Brand: BIOLOX

## 2024-07-17 DEVICE — TRIDENT II TRITANIUM CLUSTER 54E
Type: IMPLANTABLE DEVICE | Site: HIP | Status: FUNCTIONAL
Brand: TRIDENT II

## 2024-07-17 DEVICE — 6.5MM LOW PROFILE HEX SCREW 25MM
Type: IMPLANTABLE DEVICE | Site: HIP | Status: FUNCTIONAL
Brand: TRIDENT II

## 2024-07-17 DEVICE — IMPLANTABLE DEVICE: Type: IMPLANTABLE DEVICE | Site: HIP | Status: FUNCTIONAL

## 2024-07-17 DEVICE — TRIDENT X3 0 DEGREE POLYETHYLENE INSERT
Type: IMPLANTABLE DEVICE | Site: HIP | Status: FUNCTIONAL
Brand: TRIDENT X3 INSERT

## 2024-07-17 DEVICE — IMPLANTABLE DEVICE
Type: IMPLANTABLE DEVICE | Site: HIP | Status: FUNCTIONAL
Brand: JUGGERKNOT SOFT ANCHORS

## 2024-07-17 RX ORDER — OXYCODONE HYDROCHLORIDE 5 MG/1
10 TABLET ORAL EVERY 4 HOURS PRN
Status: DISCONTINUED | OUTPATIENT
Start: 2024-07-17 | End: 2024-07-18 | Stop reason: HOSPADM

## 2024-07-17 RX ORDER — ACETAMINOPHEN 325 MG/1
650 TABLET ORAL EVERY 6 HOURS SCHEDULED
Status: DISCONTINUED | OUTPATIENT
Start: 2024-07-17 | End: 2024-07-18 | Stop reason: HOSPADM

## 2024-07-17 RX ORDER — NORETHINDRONE AND ETHINYL ESTRADIOL 0.5-0.035
KIT ORAL AS NEEDED
Status: DISCONTINUED | OUTPATIENT
Start: 2024-07-17 | End: 2024-07-17

## 2024-07-17 RX ORDER — MIDAZOLAM HYDROCHLORIDE 1 MG/ML
INJECTION, SOLUTION INTRAMUSCULAR; INTRAVENOUS AS NEEDED
Status: DISCONTINUED | OUTPATIENT
Start: 2024-07-17 | End: 2024-07-17

## 2024-07-17 RX ORDER — SODIUM CHLORIDE, SODIUM LACTATE, POTASSIUM CHLORIDE, CALCIUM CHLORIDE 600; 310; 30; 20 MG/100ML; MG/100ML; MG/100ML; MG/100ML
75 INJECTION, SOLUTION INTRAVENOUS CONTINUOUS
Status: ACTIVE | OUTPATIENT
Start: 2024-07-17 | End: 2024-07-18

## 2024-07-17 RX ORDER — ASPIRIN 81 MG/1
81 TABLET ORAL 2 TIMES DAILY
Status: DISCONTINUED | OUTPATIENT
Start: 2024-07-17 | End: 2024-07-18 | Stop reason: HOSPADM

## 2024-07-17 RX ORDER — OXYCODONE HYDROCHLORIDE 5 MG/1
5 TABLET ORAL EVERY 4 HOURS PRN
Status: DISCONTINUED | OUTPATIENT
Start: 2024-07-17 | End: 2024-07-18 | Stop reason: HOSPADM

## 2024-07-17 RX ORDER — PROCHLORPERAZINE MALEATE 5 MG
10 TABLET ORAL EVERY 6 HOURS PRN
Status: DISCONTINUED | OUTPATIENT
Start: 2024-07-17 | End: 2024-07-18 | Stop reason: HOSPADM

## 2024-07-17 RX ORDER — ACETAMINOPHEN 325 MG/1
650 TABLET ORAL ONCE
Status: COMPLETED | OUTPATIENT
Start: 2024-07-17 | End: 2024-07-17

## 2024-07-17 RX ORDER — KETOROLAC TROMETHAMINE 30 MG/ML
15 INJECTION, SOLUTION INTRAMUSCULAR; INTRAVENOUS EVERY 6 HOURS
Status: COMPLETED | OUTPATIENT
Start: 2024-07-17 | End: 2024-07-18

## 2024-07-17 RX ORDER — FENTANYL CITRATE 50 UG/ML
50 INJECTION, SOLUTION INTRAMUSCULAR; INTRAVENOUS EVERY 5 MIN PRN
Status: DISCONTINUED | OUTPATIENT
Start: 2024-07-17 | End: 2024-07-17 | Stop reason: HOSPADM

## 2024-07-17 RX ORDER — TRANEXAMIC ACID 650 MG/1
1950 TABLET ORAL ONCE
Status: COMPLETED | OUTPATIENT
Start: 2024-07-17 | End: 2024-07-17

## 2024-07-17 RX ORDER — BISACODYL 5 MG
10 TABLET, DELAYED RELEASE (ENTERIC COATED) ORAL DAILY PRN
Status: DISCONTINUED | OUTPATIENT
Start: 2024-07-17 | End: 2024-07-18 | Stop reason: HOSPADM

## 2024-07-17 RX ORDER — NALOXONE HYDROCHLORIDE 1 MG/ML
0.2 INJECTION INTRAMUSCULAR; INTRAVENOUS; SUBCUTANEOUS EVERY 5 MIN PRN
Status: DISCONTINUED | OUTPATIENT
Start: 2024-07-17 | End: 2024-07-18 | Stop reason: HOSPADM

## 2024-07-17 RX ORDER — CEFAZOLIN SODIUM 2 G/100ML
2 INJECTION, SOLUTION INTRAVENOUS ONCE
Status: COMPLETED | OUTPATIENT
Start: 2024-07-17 | End: 2024-07-17

## 2024-07-17 RX ORDER — ONDANSETRON 4 MG/1
4 TABLET, ORALLY DISINTEGRATING ORAL EVERY 8 HOURS PRN
Status: DISCONTINUED | OUTPATIENT
Start: 2024-07-17 | End: 2024-07-18 | Stop reason: HOSPADM

## 2024-07-17 RX ORDER — SODIUM CHLORIDE 9 MG/ML
100 INJECTION, SOLUTION INTRAVENOUS CONTINUOUS
Status: DISCONTINUED | OUTPATIENT
Start: 2024-07-17 | End: 2024-07-17

## 2024-07-17 RX ORDER — MEPERIDINE HYDROCHLORIDE 25 MG/ML
12.5 INJECTION INTRAMUSCULAR; INTRAVENOUS; SUBCUTANEOUS EVERY 10 MIN PRN
Status: DISCONTINUED | OUTPATIENT
Start: 2024-07-17 | End: 2024-07-17 | Stop reason: HOSPADM

## 2024-07-17 RX ORDER — DOCUSATE SODIUM 100 MG/1
100 CAPSULE, LIQUID FILLED ORAL 2 TIMES DAILY
Status: DISCONTINUED | OUTPATIENT
Start: 2024-07-17 | End: 2024-07-18 | Stop reason: HOSPADM

## 2024-07-17 RX ORDER — ONDANSETRON HYDROCHLORIDE 2 MG/ML
4 INJECTION, SOLUTION INTRAVENOUS EVERY 8 HOURS PRN
Status: DISCONTINUED | OUTPATIENT
Start: 2024-07-17 | End: 2024-07-18 | Stop reason: HOSPADM

## 2024-07-17 RX ORDER — CYCLOBENZAPRINE HCL 10 MG
10 TABLET ORAL 3 TIMES DAILY PRN
Status: DISCONTINUED | OUTPATIENT
Start: 2024-07-17 | End: 2024-07-18 | Stop reason: HOSPADM

## 2024-07-17 RX ORDER — BUPIVACAINE HYDROCHLORIDE 7.5 MG/ML
INJECTION, SOLUTION INTRASPINAL AS NEEDED
Status: DISCONTINUED | OUTPATIENT
Start: 2024-07-17 | End: 2024-07-17

## 2024-07-17 RX ORDER — ROPIVACAINE/EPI/CLONIDINE/KET 2.46-0.005
SYRINGE (ML) INJECTION AS NEEDED
Status: DISCONTINUED | OUTPATIENT
Start: 2024-07-17 | End: 2024-07-17 | Stop reason: HOSPADM

## 2024-07-17 RX ORDER — LIDOCAINE HYDROCHLORIDE 20 MG/ML
INJECTION, SOLUTION EPIDURAL; INFILTRATION; INTRACAUDAL; PERINEURAL AS NEEDED
Status: DISCONTINUED | OUTPATIENT
Start: 2024-07-17 | End: 2024-07-17

## 2024-07-17 RX ORDER — SODIUM CHLORIDE 0.9 G/100ML
IRRIGANT IRRIGATION AS NEEDED
Status: DISCONTINUED | OUTPATIENT
Start: 2024-07-17 | End: 2024-07-17 | Stop reason: HOSPADM

## 2024-07-17 RX ORDER — ONDANSETRON HYDROCHLORIDE 2 MG/ML
4 INJECTION, SOLUTION INTRAVENOUS ONCE AS NEEDED
Status: DISCONTINUED | OUTPATIENT
Start: 2024-07-17 | End: 2024-07-17 | Stop reason: HOSPADM

## 2024-07-17 RX ORDER — PROPOFOL 10 MG/ML
INJECTION, EMULSION INTRAVENOUS AS NEEDED
Status: DISCONTINUED | OUTPATIENT
Start: 2024-07-17 | End: 2024-07-17

## 2024-07-17 RX ORDER — FENTANYL CITRATE 50 UG/ML
25 INJECTION, SOLUTION INTRAMUSCULAR; INTRAVENOUS EVERY 5 MIN PRN
Status: DISCONTINUED | OUTPATIENT
Start: 2024-07-17 | End: 2024-07-17 | Stop reason: HOSPADM

## 2024-07-17 RX ORDER — TRANEXAMIC ACID 650 MG/1
1950 TABLET ORAL ONCE
Status: COMPLETED | OUTPATIENT
Start: 2024-07-18 | End: 2024-07-18

## 2024-07-17 RX ORDER — PROCHLORPERAZINE 25 MG/1
25 SUPPOSITORY RECTAL EVERY 12 HOURS PRN
Status: DISCONTINUED | OUTPATIENT
Start: 2024-07-17 | End: 2024-07-18 | Stop reason: HOSPADM

## 2024-07-17 RX ORDER — CEFAZOLIN SODIUM 2 G/100ML
2 INJECTION, SOLUTION INTRAVENOUS EVERY 8 HOURS
Status: COMPLETED | OUTPATIENT
Start: 2024-07-17 | End: 2024-07-18

## 2024-07-17 RX ORDER — CELECOXIB 200 MG/1
200 CAPSULE ORAL ONCE
Status: COMPLETED | OUTPATIENT
Start: 2024-07-17 | End: 2024-07-17

## 2024-07-17 RX ORDER — SODIUM CHLORIDE, SODIUM LACTATE, POTASSIUM CHLORIDE, CALCIUM CHLORIDE 600; 310; 30; 20 MG/100ML; MG/100ML; MG/100ML; MG/100ML
100 INJECTION, SOLUTION INTRAVENOUS CONTINUOUS
Status: DISCONTINUED | OUTPATIENT
Start: 2024-07-17 | End: 2024-07-17 | Stop reason: HOSPADM

## 2024-07-17 RX ORDER — DIPHENHYDRAMINE HCL 12.5MG/5ML
12.5 LIQUID (ML) ORAL EVERY 6 HOURS PRN
Status: DISCONTINUED | OUTPATIENT
Start: 2024-07-17 | End: 2024-07-18 | Stop reason: HOSPADM

## 2024-07-17 RX ORDER — PROCHLORPERAZINE EDISYLATE 5 MG/ML
10 INJECTION INTRAMUSCULAR; INTRAVENOUS EVERY 6 HOURS PRN
Status: DISCONTINUED | OUTPATIENT
Start: 2024-07-17 | End: 2024-07-18 | Stop reason: HOSPADM

## 2024-07-17 SDOH — SOCIAL STABILITY: SOCIAL INSECURITY
WITHIN THE LAST YEAR, HAVE TO BEEN RAPED OR FORCED TO HAVE ANY KIND OF SEXUAL ACTIVITY BY YOUR PARTNER OR EX-PARTNER?: NO

## 2024-07-17 SDOH — SOCIAL STABILITY: SOCIAL INSECURITY: DOES ANYONE TRY TO KEEP YOU FROM HAVING/CONTACTING OTHER FRIENDS OR DOING THINGS OUTSIDE YOUR HOME?: NO

## 2024-07-17 SDOH — HEALTH STABILITY: MENTAL HEALTH
HOW OFTEN DO YOU NEED TO HAVE SOMEONE HELP YOU WHEN YOU READ INSTRUCTIONS, PAMPHLETS, OR OTHER WRITTEN MATERIAL FROM YOUR DOCTOR OR PHARMACY?: NEVER

## 2024-07-17 SDOH — SOCIAL STABILITY: SOCIAL NETWORK: HOW OFTEN DO YOU GET TOGETHER WITH FRIENDS OR RELATIVES?: MORE THAN THREE TIMES A WEEK

## 2024-07-17 SDOH — HEALTH STABILITY: PHYSICAL HEALTH: ON AVERAGE, HOW MANY MINUTES DO YOU ENGAGE IN EXERCISE AT THIS LEVEL?: 0 MIN

## 2024-07-17 SDOH — SOCIAL STABILITY: SOCIAL INSECURITY: DO YOU FEEL ANYONE HAS EXPLOITED OR TAKEN ADVANTAGE OF YOU FINANCIALLY OR OF YOUR PERSONAL PROPERTY?: NO

## 2024-07-17 SDOH — ECONOMIC STABILITY: INCOME INSECURITY: HOW HARD IS IT FOR YOU TO PAY FOR THE VERY BASICS LIKE FOOD, HOUSING, MEDICAL CARE, AND HEATING?: NOT HARD AT ALL

## 2024-07-17 SDOH — SOCIAL STABILITY: SOCIAL INSECURITY: WITHIN THE LAST YEAR, HAVE YOU BEEN AFRAID OF YOUR PARTNER OR EX-PARTNER?: NO

## 2024-07-17 SDOH — SOCIAL STABILITY: SOCIAL INSECURITY: WITHIN THE LAST YEAR, HAVE YOU BEEN HUMILIATED OR EMOTIONALLY ABUSED IN OTHER WAYS BY YOUR PARTNER OR EX-PARTNER?: NO

## 2024-07-17 SDOH — HEALTH STABILITY: MENTAL HEALTH
STRESS IS WHEN SOMEONE FEELS TENSE, NERVOUS, ANXIOUS, OR CAN'T SLEEP AT NIGHT BECAUSE THEIR MIND IS TROUBLED. HOW STRESSED ARE YOU?: NOT AT ALL

## 2024-07-17 SDOH — SOCIAL STABILITY: SOCIAL INSECURITY: ARE YOU OR HAVE YOU BEEN THREATENED OR ABUSED PHYSICALLY, EMOTIONALLY, OR SEXUALLY BY ANYONE?: NO

## 2024-07-17 SDOH — SOCIAL STABILITY: SOCIAL NETWORK
IN A TYPICAL WEEK, HOW MANY TIMES DO YOU TALK ON THE PHONE WITH FAMILY, FRIENDS, OR NEIGHBORS?: MORE THAN THREE TIMES A WEEK

## 2024-07-17 SDOH — SOCIAL STABILITY: SOCIAL INSECURITY: ABUSE: ADULT

## 2024-07-17 SDOH — ECONOMIC STABILITY: INCOME INSECURITY: IN THE PAST 12 MONTHS, HAS THE ELECTRIC, GAS, OIL, OR WATER COMPANY THREATENED TO SHUT OFF SERVICE IN YOUR HOME?: NO

## 2024-07-17 SDOH — SOCIAL STABILITY: SOCIAL NETWORK
DO YOU BELONG TO ANY CLUBS OR ORGANIZATIONS SUCH AS CHURCH GROUPS UNIONS, FRATERNAL OR ATHLETIC GROUPS, OR SCHOOL GROUPS?: NO

## 2024-07-17 SDOH — ECONOMIC STABILITY: FOOD INSECURITY: WITHIN THE PAST 12 MONTHS, THE FOOD YOU BOUGHT JUST DIDN'T LAST AND YOU DIDN'T HAVE MONEY TO GET MORE.: NEVER TRUE

## 2024-07-17 SDOH — SOCIAL STABILITY: SOCIAL NETWORK: HOW OFTEN DO YOU ATTEND CHURCH OR RELIGIOUS SERVICES?: NEVER

## 2024-07-17 SDOH — SOCIAL STABILITY: SOCIAL NETWORK: ARE YOU MARRIED, WIDOWED, DIVORCED, SEPARATED, NEVER MARRIED, OR LIVING WITH A PARTNER?: MARRIED

## 2024-07-17 SDOH — ECONOMIC STABILITY: INCOME INSECURITY: IN THE LAST 12 MONTHS, WAS THERE A TIME WHEN YOU WERE NOT ABLE TO PAY THE MORTGAGE OR RENT ON TIME?: NO

## 2024-07-17 SDOH — ECONOMIC STABILITY: FOOD INSECURITY: WITHIN THE PAST 12 MONTHS, YOU WORRIED THAT YOUR FOOD WOULD RUN OUT BEFORE YOU GOT MONEY TO BUY MORE.: NEVER TRUE

## 2024-07-17 SDOH — SOCIAL STABILITY: SOCIAL INSECURITY: HAVE YOU HAD THOUGHTS OF HARMING ANYONE ELSE?: NO

## 2024-07-17 SDOH — SOCIAL STABILITY: SOCIAL INSECURITY: HAS ANYONE EVER THREATENED TO HURT YOUR FAMILY OR YOUR PETS?: NO

## 2024-07-17 SDOH — SOCIAL STABILITY: SOCIAL NETWORK: HOW OFTEN DO YOU ATTENT MEETINGS OF THE CLUB OR ORGANIZATION YOU BELONG TO?: NEVER

## 2024-07-17 SDOH — SOCIAL STABILITY: SOCIAL INSECURITY
WITHIN THE LAST YEAR, HAVE YOU BEEN KICKED, HIT, SLAPPED, OR OTHERWISE PHYSICALLY HURT BY YOUR PARTNER OR EX-PARTNER?: NO

## 2024-07-17 SDOH — SOCIAL STABILITY: SOCIAL INSECURITY: HAVE YOU HAD ANY THOUGHTS OF HARMING ANYONE ELSE?: NO

## 2024-07-17 SDOH — SOCIAL STABILITY: SOCIAL INSECURITY: DO YOU FEEL UNSAFE GOING BACK TO THE PLACE WHERE YOU ARE LIVING?: NO

## 2024-07-17 SDOH — SOCIAL STABILITY: SOCIAL INSECURITY: WERE YOU ABLE TO COMPLETE ALL THE BEHAVIORAL HEALTH SCREENINGS?: YES

## 2024-07-17 SDOH — SOCIAL STABILITY: SOCIAL INSECURITY: ARE THERE ANY APPARENT SIGNS OF INJURIES/BEHAVIORS THAT COULD BE RELATED TO ABUSE/NEGLECT?: NO

## 2024-07-17 SDOH — HEALTH STABILITY: PHYSICAL HEALTH: ON AVERAGE, HOW MANY DAYS PER WEEK DO YOU ENGAGE IN MODERATE TO STRENUOUS EXERCISE (LIKE A BRISK WALK)?: 0 DAYS

## 2024-07-17 ASSESSMENT — COGNITIVE AND FUNCTIONAL STATUS - GENERAL
MOVING TO AND FROM BED TO CHAIR: A LITTLE
DAILY ACTIVITIY SCORE: 19
CLIMB 3 TO 5 STEPS WITH RAILING: A LOT
DRESSING REGULAR UPPER BODY CLOTHING: A LITTLE
TURNING FROM BACK TO SIDE WHILE IN FLAT BAD: A LITTLE
MOBILITY SCORE: 13
WALKING IN HOSPITAL ROOM: A LOT
WALKING IN HOSPITAL ROOM: A LITTLE
MOVING FROM LYING ON BACK TO SITTING ON SIDE OF FLAT BED WITH BEDRAILS: A LITTLE
MOVING FROM LYING ON BACK TO SITTING ON SIDE OF FLAT BED WITH BEDRAILS: A LITTLE
PATIENT BASELINE BEDBOUND: NO
MOBILITY SCORE: 13
DRESSING REGULAR LOWER BODY CLOTHING: A LITTLE
STANDING UP FROM CHAIR USING ARMS: A LOT
WALKING IN HOSPITAL ROOM: A LITTLE
DRESSING REGULAR UPPER BODY CLOTHING: A LITTLE
CLIMB 3 TO 5 STEPS WITH RAILING: A LOT
PERSONAL GROOMING: A LITTLE
TURNING FROM BACK TO SIDE WHILE IN FLAT BAD: A LOT
DRESSING REGULAR LOWER BODY CLOTHING: A LOT
WALKING IN HOSPITAL ROOM: A LOT
TURNING FROM BACK TO SIDE WHILE IN FLAT BAD: A LITTLE
MOVING TO AND FROM BED TO CHAIR: A LITTLE
DAILY ACTIVITIY SCORE: 18
TOILETING: A LOT
TURNING FROM BACK TO SIDE WHILE IN FLAT BAD: A LITTLE
DAILY ACTIVITIY SCORE: 17
STANDING UP FROM CHAIR USING ARMS: A LITTLE
CLIMB 3 TO 5 STEPS WITH RAILING: TOTAL
HELP NEEDED FOR BATHING: A LITTLE
MOBILITY SCORE: 17
DRESSING REGULAR LOWER BODY CLOTHING: A LOT
MOVING FROM LYING ON BACK TO SITTING ON SIDE OF FLAT BED WITH BEDRAILS: A LITTLE
TOILETING: A LOT
TOILETING: A LOT
HELP NEEDED FOR BATHING: A LITTLE
HELP NEEDED FOR BATHING: A LITTLE
DRESSING REGULAR UPPER BODY CLOTHING: A LITTLE
MOVING FROM LYING ON BACK TO SITTING ON SIDE OF FLAT BED WITH BEDRAILS: A LITTLE
STANDING UP FROM CHAIR USING ARMS: A LITTLE
CLIMB 3 TO 5 STEPS WITH RAILING: A LOT
MOBILITY SCORE: 17
STANDING UP FROM CHAIR USING ARMS: A LOT
MOVING TO AND FROM BED TO CHAIR: A LOT
MOVING TO AND FROM BED TO CHAIR: A LOT

## 2024-07-17 ASSESSMENT — PAIN SCALES - GENERAL
PAINLEVEL_OUTOF10: 4
PAINLEVEL_OUTOF10: 0 - NO PAIN
PAINLEVEL_OUTOF10: 0 - NO PAIN
PAINLEVEL_OUTOF10: 2
PAINLEVEL_OUTOF10: 0 - NO PAIN
PAINLEVEL_OUTOF10: 0 - NO PAIN
PAINLEVEL_OUTOF10: 8
PAINLEVEL_OUTOF10: 1
PAINLEVEL_OUTOF10: 5 - MODERATE PAIN
PAINLEVEL_OUTOF10: 0 - NO PAIN

## 2024-07-17 ASSESSMENT — PAIN - FUNCTIONAL ASSESSMENT
PAIN_FUNCTIONAL_ASSESSMENT: 0-10

## 2024-07-17 ASSESSMENT — PAIN DESCRIPTION - DESCRIPTORS
DESCRIPTORS: ACHING
DESCRIPTORS: CRAMPING

## 2024-07-17 ASSESSMENT — ACTIVITIES OF DAILY LIVING (ADL)
PATIENT'S MEMORY ADEQUATE TO SAFELY COMPLETE DAILY ACTIVITIES?: YES
GROOMING: INDEPENDENT
JUDGMENT_ADEQUATE_SAFELY_COMPLETE_DAILY_ACTIVITIES: YES
ASSISTIVE_DEVICE: WALKER
BATHING: INDEPENDENT
ADEQUATE_TO_COMPLETE_ADL: YES
TOILETING: INDEPENDENT
HEARING - LEFT EAR: FUNCTIONAL
FEEDING YOURSELF: INDEPENDENT
LACK_OF_TRANSPORTATION: NO
WALKS IN HOME: INDEPENDENT
HEARING - RIGHT EAR: FUNCTIONAL
DRESSING YOURSELF: INDEPENDENT
LACK_OF_TRANSPORTATION: NO

## 2024-07-17 ASSESSMENT — LIFESTYLE VARIABLES
SUBSTANCE_ABUSE_PAST_12_MONTHS: NO
HOW OFTEN DO YOU HAVE A DRINK CONTAINING ALCOHOL: MONTHLY OR LESS
AUDIT-C TOTAL SCORE: 1
SKIP TO QUESTIONS 9-10: 1
HOW MANY STANDARD DRINKS CONTAINING ALCOHOL DO YOU HAVE ON A TYPICAL DAY: 1 OR 2
AUDIT-C TOTAL SCORE: 1
HOW OFTEN DO YOU HAVE 6 OR MORE DRINKS ON ONE OCCASION: NEVER
PRESCIPTION_ABUSE_PAST_12_MONTHS: NO

## 2024-07-17 ASSESSMENT — COLUMBIA-SUICIDE SEVERITY RATING SCALE - C-SSRS
6. HAVE YOU EVER DONE ANYTHING, STARTED TO DO ANYTHING, OR PREPARED TO DO ANYTHING TO END YOUR LIFE?: NO
2. HAVE YOU ACTUALLY HAD ANY THOUGHTS OF KILLING YOURSELF?: NO
2. HAVE YOU ACTUALLY HAD ANY THOUGHTS OF KILLING YOURSELF?: NO
6. HAVE YOU EVER DONE ANYTHING, STARTED TO DO ANYTHING, OR PREPARED TO DO ANYTHING TO END YOUR LIFE?: NO
1. IN THE PAST MONTH, HAVE YOU WISHED YOU WERE DEAD OR WISHED YOU COULD GO TO SLEEP AND NOT WAKE UP?: NO
1. IN THE PAST MONTH, HAVE YOU WISHED YOU WERE DEAD OR WISHED YOU COULD GO TO SLEEP AND NOT WAKE UP?: NO

## 2024-07-17 ASSESSMENT — PAIN DESCRIPTION - ORIENTATION: ORIENTATION: LEFT

## 2024-07-17 ASSESSMENT — PAIN DESCRIPTION - LOCATION: LOCATION: HIP

## 2024-07-17 ASSESSMENT — PATIENT HEALTH QUESTIONNAIRE - PHQ9
1. LITTLE INTEREST OR PLEASURE IN DOING THINGS: NOT AT ALL
SUM OF ALL RESPONSES TO PHQ9 QUESTIONS 1 & 2: 0
2. FEELING DOWN, DEPRESSED OR HOPELESS: NOT AT ALL

## 2024-07-17 NOTE — PROGRESS NOTES
07/17/24 1531   Discharge Planning   Living Arrangements Spouse/significant other   Support Systems Spouse/significant other   Assistance Needed yes, PTA pt states Ind ADLS and IADLS with cane or electric scooter, drives, has been off work since May, denies falls. Pt owns cane, electric scooter, FWW, bedside commode, ADA height toilet, shower chair and grab bars, walk-in shower   Type of Residence Private residence  (2 level home + basement, bedroom/bathroom with walk-in shower 1st floor)   Number of Stairs to Enter Residence 0   Number of Stairs Within Residence 12   Do you have animals or pets at home? Yes   Type of Animals or Pets 1 Dog   Home or Post Acute Services In home services   Type of Home Care Services Home OT;Home PT   Expected Discharge Disposition HH Services   Does the patient need discharge transport arranged? No   Financial Resource Strain   How hard is it for you to pay for the very basics like food, housing, medical care, and heating? Not hard   Housing Stability   In the last 12 months, was there a time when you were not able to pay the mortgage or rent on time? N   In the past 12 months, how many times have you moved where you were living? 0   At any time in the past 12 months, were you homeless or living in a shelter (including now)? N   Transportation Needs   In the past 12 months, has lack of transportation kept you from medical appointments or from getting medications? no   In the past 12 months, has lack of transportation kept you from meetings, work, or from getting things needed for daily living? No   Patient Choice   Provider Choice list and CMS website (https://medicare.gov/care-compare#search) for post-acute Quality and Resource Measure Data were provided and reviewed with: Patient;Family   Patient / Family choosing to utilize agency / facility established prior to hospitalization No     Pt is s/p Elective left total hip arthroplasty lateral approach on 7/17/24 with Dr. Amando Spencer.  Pt is weight bearing as tolerated with Anterior hip precautions. PT/OT eval AMPAC scores are currently pending. Pt states DC preference is home with [facility] and agency of choice is [facility]. Demographics verified.  CNP notified of pt [facility] agency of choice. CT team will monitor case for progression and DC planning.

## 2024-07-17 NOTE — DISCHARGE INSTRUCTIONS
Total Hip Replacement   Discharge Instructions    To prevent Clot formation, you have been placed on the following medication:  ASA 81 mg twice a day for 30 days started on 7/17/24    Surgical Site Care:  Change dressing once a day and PRN (as needed). Apply 4 x 4 sponge and light tape. If glue present, leave open to air.  You may leave wound open to air after initial dressing removal, if wound is clean, dry and intact  If Aquacel Ag dressing is present, do not remove dressing for 7 days, unless heavily saturated. If heavily saturated, remove dressing and start using instructions above  Staples will be removed on post-operative day 14 and steri-strips applied  Showering is permitted starting POD1 if waterproof Aquacel dressing is present or when the incision is covered with 4 x 4 and Tegaderm waterproof dressing  Until all areas of incision are healed.    Physical Therapy:  Weight Bearing Status:  Weight-bearing as tolerated  Anterior Hip precautions, per therapy handout     Pain Medications  You were given  oxycodone  Wean off pain medications as you deem appropriate as long as pain is under control  Do not exceed 4,000 mg of acetaminophen from all sources in a 24 hour period    Cold packs/Ice packs/Machine  May be used 5 times daily for 15-30 minutes as necessary  Be sure to have a barrier (cloth, clothing, towel) between the site and the ice pack to prevent frostbite    Contact [facility] office if  Increased redness, swelling, drainage of any kind, and/or pain to surgery site.  As well as new onset fevers and or chills.  These could signify an infection.  Calf or thigh tenderness to touch as well as increased swelling or redness.  This could signify a clot formation.  Numbness or tingling to an area around the incision site or below the incision site (toes).  Any rash appears, increased  or new onset nausea/vomiting occur.  This may indicate a reaction to a medication.  Phone #  [phone].  Follow up with Surgeon in 2 weeks  I acknowledge that I have received nenita hose and understand the instructions on how and when to wear them (on during the day, off at night)   Discharging RN who has gone over instructions and acknowledges nenita hose have been received     Ice 5 times a day for 20 minutes each session to operative extremity for two weeks.   NENITA hose to be worn for three weeks. Can be removed for skin care and hygiene.   Incentive spirometer 10 times every hour while awake for two weeks.

## 2024-07-17 NOTE — OP NOTE
Arthroplasty Total Hip Lateral Approach (L) Operative Note     Date: 2024  OR Location: [facility]    Name: Nati Arauz, : 1975, Age: 49 y.o., MRN: [id number], Sex: female    Diagnosis  Pre-op Diagnosis      * Unilateral primary osteoarthritis, left hip [M16.12] Post-op Diagnosis     * Unilateral primary osteoarthritis, left hip [M16.12]     Procedures  Arthroplasty Total Hip Lateral Approach  95248 - [place] ARTHRP ACETBLR/PROX FEM PROSTC AGRFT/ALGRFT      Surgeons      * Amando Spencer - Primary    Resident/Fellow/Other Assistant:  Surgeons and Role:     * Daniel Rivera PA-C - Assisting    Procedure Summary  Anesthesia: Spinal  ASA: II  Anesthesia Staff: Anesthesiologist: Nils Galindo MD  Estimated Blood Loss: 50 mL  Intra-op Medications:   Administrations occurring from 0845 to 1045 on 24:   Medication Name Total Dose   ropivacaine-epinephrine-clonidine-ketorolac 2.46-0.005- 0.0008-0.3mg/mL periarticular syringe 100 mL   sodium chloride 0.9 % irrigation solution 1,000 mL   ceFAZolin (Ancef) 2 g in dextrose (iso)  mL 2 g              Anesthesia Record               Intraprocedure I/O Totals          Intake    ceFAZolin (Ancef) 2 g in dextrose (iso)  mL 100.00 mL    Total Intake 100 mL          Specimen: No specimens collected     Staff:   Circulator: Anita Estrella Person: Tamela         Drains and/or Catheters: * None in log *    Tourniquet Times:         Implants:  Implants       Type Name Action Serial No.      Implant [id number], 2.9MM, DBL LOADED, P2 MAX BRAID, W/ TAPERED NEEDLE - [id number] Implanted      Joint SHELL, TRIDENT II, CLUSTERHOLE, 54E - [id number] Implanted      Screw SCREW, LOW PROFILE HEX, 6.5 X 25 MM - [id number] Implanted       SIZE 4, INSIGNIA HIP STEM, HIGH OFFSET Implanted      Joint INSERT, TRIDENT X3 POLYETHYLENE, 0 DEG, 36MM E - [id number] Implanted      Joint HEAD, FEMUR V40 36MM 0MM BIOLOX DELTA - [id number] Implanted               Findings: Osteoarthrosis left  hip    Indications: Nati Arauz is an 49 y.o. female who is having surgery for Unilateral primary osteoarthritis, left hip [M16.12].     The patient was seen in the preoperative area. The risks, benefits, complications, treatment options, non-operative alternatives, expected recovery and outcomes were discussed with the patient. The possibilities of reaction to medication, pulmonary aspiration, injury to surrounding structures, bleeding, recurrent infection, the need for additional procedures, failure to diagnose a condition, and creating a complication requiring transfusion or operation were discussed with the patient. The patient concurred with the proposed plan, giving informed consent.  The site of surgery was properly noted/marked if necessary per policy. The patient has been actively warmed in preoperative area. Preoperative antibiotics have been ordered and given within 1 hours of incision. Venous thrombosis prophylaxis have been ordered including bilateral sequential compression devices and chemical prophylaxis    Procedure Details: Indications: The patient has end-stage arthrosis of the left hip. The patient wishes to proceed with total hip arthroplasty. The procedure was explained along with the risks, benefits alternatives being reviewed. Potential risks including but not limited to infection, neurovascular complication, instability, loosening, wear, limb length inequality, DVT along with the potential need for reoperation and/or revision surgery were all discussed with the patient and they consented to the procedure.        Components: [place] Insignia, Trident 2    See above implant record    Procedure:    The patient was brought to the operative suite. A spinal anesthetic was administered per anesthesia. The patient was then placed in the lateral decubitus position, left side up. An axillary roll was placed. Pegs were placed in the pegboard and padded. The down leg was well-padded at the knee and  ankle. A U drape was used to exclude the operative hip and lower extremity from the rest of the body. The hip and lower extremity was then sterilely prepped with ChloraPrep then sterilely draped in the usual manner. Timeout was performed, the patient was then identified, the site, laterality and procedure confirmed along with the administration of the antibiotics.    I began by identifying landmarks. I made a lateral incision centered over the greater trochanter for a direct lateral approach to the hip. Dissection was carried down carefully through skin and subcutaneous tissues. Some of the vessels in the fatty tissue were cauterized. Identified the tensor fascia and IT band. I incised this in the line of the incision. A Charnley retractor was then placed. I then identified the gluteus medius. About an inch from the posterior border of the gluteus medius I split the medius musculature and identified the gluteus minimus tendon. I incised through the minimus tendon. I split the minimus musculature and identified the capsule. I then incised through the capsule. Next I incised through the vastus lateralis tendon. I split the vastus musculature down to the lateral femur. The crossing vessels were cauterized. I then raised as a cuff,the soft tissues anteriorly off of the trochanter and neck for the direct lateral approach to the hip. The hip was then dislocated anteriorly. I then measured from the lesser trochanter based on templating and marked the length of my neck cut. I used a broach to lion the angle of the neck cut. I then made the neck cut with a saw and completed it with an osteotome and mallet. I began preparing the femur. First using a box osteotome then a T-handle canal finding reamer I began then broaching. I broached sequentially to the appropriate size. A calcar planar was used to smooth off the neck.    I turned my attention then to the acetabulum. Retractors were placed around the acetabulum. I debrided  the labrum. I then began reaming sequentially up to the appropriate size. I thoroughly and copiously irrigated with normal saline. I then impacted the cup using the outrigger and approximately 45 degrees of horizontal abduction and 20 degrees of anteversion. Once the cup was impacted into place I checked it with a Janice to be sure it was stable. Next I drilled and placed 1 screw for supplemental fixation. An apex hole  was placed. I then impacted the polyethylene liner, I checked this with a [place] as well to be sure it was seated. I then debrided any osteophytes from around the acetabular rim with an osteotome and mallet.    I began trialing then off the femoral broach. Once I was able to achieve a stable construct I redislocated the hip. The broach was removed. I then impacted the stem matching the anteversion that I had broached to. I then cleaned off the torres taper. The head was then impacted onto the stem. I check to be sure the head was seated. The hip was reduced. I again took the hip through range of motion and was satisfied with my stability. I then infiltrated the soft tissues with a mixture of ropivacaine with epinephrine, clonidine and Toradol.    At that point I thoroughly copiously irrigated with normal saline. Attention was turned to closure. The capsule was repaired with #1 Ethibond. The gluteus minimus was repaired with #1 Vicryl. I then drilled and placed the Biomet juggernaut suture anchor with #2 max braid suture into the trochanter. I passed the sutures through the cuff of anterior tissues and tied this bringing this down to the greater trochanter. I repaired the split in the medius musculature with #1 Vicryl. I repaired the vastus lateralis with #1 Vicryl. I then repaired the IT band with Ethibond. The deep fatty tissue was repaired with 0 Vicryl. Subcutaneous tissue closure was with 2-0 Vicryl. The skin was closed with Monocryl and Dermabond. A sterile waterproof dressing was  applied.      The physician assistant was present for the entire case.  Given the nature of the procedure and disease process a skilled surgical assistant was necessary for the case.  The assistant was necessary for retraction and helped directly facilitate completion of the surgery.  A certified scrub tech was at the back table managing instruments and supplies for the surgical procedure.    Complications:  None; patient tolerated the procedure well.    Disposition: PACU - hemodynamically stable.  Condition: stable         Additional Details: Not applicable    Attending Attestation: I performed the procedure.    Amando Spencer  Phone Number: [phone]

## 2024-07-17 NOTE — ANESTHESIA PREPROCEDURE EVALUATION
Nati Arauz is a 49 y.o. female here for:      Arthroplasty Total Hip Lateral Approach  With Amando Spencer MD  Pre-Op Diagnosis Codes:      * Unilateral primary osteoarthritis, left hip [M16.12]    Lab Results   Component Value Date    HGB 12.8 07/03/2024    HCT 38.8 07/03/2024    WBC 7.4 07/03/2024     07/03/2024     07/03/2024    K 4.4 07/03/2024     (H) 07/03/2024    CREATININE 0.84 07/03/2024    BUN 23 07/03/2024       Social History     Substance and Sexual Activity   Drug Use Never      Tobacco Use: High Risk (7/10/2024)    Patient History     Smoking Tobacco Use: Every Day     Smokeless Tobacco Use: Never     Passive Exposure: Not on file      Social History     Substance and Sexual Activity   Alcohol Use Not Currently    Comment: Rare        No Known Allergies    Current Outpatient Medications   Medication Instructions    chlorhexidine (Peridex) 0.12 % solution 15 mL, Mouth/Throat, As needed, Use the night before and morning of surgery    estradiol (ESTRACE) 1 mg, oral, Daily    ibuprofen 800 mg, oral, Daily    meloxicam (MOBIC) 15 mg, oral, Daily    mupirocin (Bactroban) 2 % ointment Topical, 3 times daily RT, Apply small amount in each nostril 3 times a day for the next 10 days    tiZANidine (ZANAFLEX) 4 mg, oral, Every 8 hours PRN       Past Medical History:   Diagnosis Date    Anxiety     Arthritis     Darier's disease     History of blood transfusion     Hyperlipidemia     Migraines     Panic attacks     Use of cane as ambulatory aid     Wears glasses        Past Surgical History:   Procedure Laterality Date    COLONOSCOPY      DILATION AND CURETTAGE OF UTERUS      ECTOPIC PREGNANCY SURGERY      HYSTERECTOMY      1 ovary left    TONSILLECTOMY      and adenoids    WISDOM TOOTH EXTRACTION         Family History   Problem Relation Name Age of Onset    Lung cancer Mother      Brain cancer Mother      Bone cancer Mother      Heart disease Father      Heart attack Father      COPD  "Father      Other (smoker) Father      Diabetes Father      Other (esophageal problem) Father      Other (heart stent) Father      Alcohol abuse Father      Drug abuse Father      Rheum arthritis Maternal Grandmother      Other (blood cancer) Maternal Grandfather      Diabetes Paternal Grandmother      Other () Paternal Grandfather      Alcohol abuse Paternal Grandfather         Relevant Problems   Cardiac   (+) Mixed hyperlipidemia      Neuro   (+) Generalized anxiety disorder      Musculoskeletal   (+) Unilateral primary osteoarthritis, left hip       Visit Vitals  /70 Comment: right upper arm   Temp 36 °C (96.8 °F) (Temporal)   Resp 18   Ht 1.549 m (5' 1\")   Wt 80.5 kg (177 lb 7.5 oz)   SpO2 97%   BMI 33.53 kg/m²   OB Status Hysterectomy   Smoking Status Every Day   BSA 1.86 m²       NPO Details:  NPO/Void Status  Carbohydrate Drink Given Prior to Surgery? : N  Date of Last Liquid: 07/16/24  Time of Last Liquid: 2300  Date of Last Solid: 07/16/24  Time of Last Solid: 1900  Last Intake Type: Clear fluids  Time of Last Void: 0600        Physical Exam    Airway  Mallampati: II     Cardiovascular - normal exam     Dental - normal exam     Pulmonary - normal exam     Abdominal   (+) obese  Abdomen: soft             Anesthesia Plan    History of general anesthesia?: yes  History of complications of general anesthesia?: no    ASA 2     spinal     intravenous induction   Anesthetic plan and risks discussed with patient.      "

## 2024-07-17 NOTE — INTERVAL H&P NOTE
Interval History and Physical     I have interviewed and examined the patient and reviewed the recent History and Physical.  There have been no changes to the recent H&P documentation.     The patient understands the planned operation and its associated risks and benefits and agrees to proceed.     The surgical consent form has been signed.    There were no vitals taken for this visit.     Amando Spencer MD

## 2024-07-17 NOTE — PROGRESS NOTES
Physical Therapy    Physical Therapy Evaluation & Treatment    Patient Name: Nati Arauz  MRN: [id number]  Today's Date: 7/17/2024   Time Calculation  Start Time: 1503 (Pt. seen from 3530-2413 and 9127-7155; pt too numb from recent nerve block to complete OOB during 1st session)  Stop Time: 1604  Time Calculation (min): 23 min  607/607-A    Assessment/Plan   PT Assessment  PT Assessment Results: Decreased strength, Impaired balance, Decreased mobility, Obesity, Impaired sensation, Orthopedic restrictions  Rehab Prognosis: Good  Barriers to Discharge: None identified  End of Session Communication: Bedside nurse, PCT/NA/CTA  Assessment Comment: Pt. is s/p TING POD#0 and is progressing as expected. Pt. will benefit from continued PT to increase mobility and indep.  End of Session Patient Position: Up in chair, Alarm on  IP OR SWING BED PT PLAN  Inpatient or Swing Bed: Inpatient  PT Plan  Treatment/Interventions: Bed mobility, Transfer training, Gait training, Balance training, Strengthening, Therapeutic exercise, Therapeutic activity, Home exercise program  PT Plan: Ongoing PT  PT Frequency: BID  PT Discharge Recommendations: Low intensity level of continued care (with PRN assist)  Equipment Recommended upon Discharge: Wheeled walker  PT Recommended Transfer Status: Assist x1, Assistive device  PT - OK to Discharge: Yes (pending progress towards PT goals POD#1 and medical clearance)          General Visit Information:  General  Reason for Referral: s/p L TING  Referred By: Tj 7/17  Past Medical History Relevant to Rehab: morbid obesity, OA, current smoker, hysterectomy  Family/Caregiver Present: Yes  Caregiver Feedback: spouse  Patient Position Received: Bed, 2 rail up, Alarm on  General Comment: L hip OA; admit for elective L TING with Dr. Spencer 7/17/24    Home Living:  Home Living  Home Living Comments: Lives with spouse and 2 adult children in 2 story home with ramp to enter. 1st floor set up available with bed and  full bath; walk-in shower with seat and GBs. Pt. owns 2ww, cane, BSC and electric scooter. (Father-in-law lived with pt. and passed away 1 week ago).    Prior Level of Function:  Prior Function Per Pt/Caregiver Report  Prior Function Comments: Mod. indep. amb. with cane for short distances; electric scooter for longer distances. 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 No recent falls. +drives.    Precautions:  Precautions  LE Weight Bearing Status: Weight Bearing as Tolerated  Medical Precautions: Fall precautions  Post-Surgical Precautions: Left hip precautions (anterior)      Objective     Pain:  Pain Assessment  Pain Assessment: 0-10  0-10 (Numeric) Pain Score: 0 - No pain    Cognition:  Cognition  Overall Cognitive Status: Within Functional Limits    General Assessments:        Sensation  Sensation Comment: Some continued numbness/tingling proximal LLE d/t recent nerve block  Strength  Strength Comments: RLE 5/5; L hip flex 3-/5, L quad 3+/5, L ankle DF/PF WNL; BUE WFL                Functional Assessments:     Bed Mobility  Bed Mobility:  (supine to sit with minAx1 for LLE lifting assistance; HOB slightly elevated)  Transfers  Transfer:  (sit to/from stand with ww and modAx1; cues for hand placement and technique)  Ambulation/Gait Training  Ambulation/Gait Training Performed:  (Amb. bed to chair ~ 3 ft with ww and modAx1; blocking of L knee due to L quad instability likely from recent nerve block/continued numbness/tingling. Slow, step-to gait)          Extremity/Trunk Assessments:  RUE   RUE : Within Functional Limits  LUE   LUE: Within Functional Limits  RLE   RLE : Within Functional Limits  LLE   LLE :  (L knee and ankle AROM WNL)    Treatments:     Review of anterior hip precautions and handout issued. LE HEP handout issued and ther-ex initiated: APs, L QS, GS, L heel slides AAROM, YOLA LAQ     Outcome Measures:  [facility] Basic Mobility  Turning from your back to your side while in a flat bed  without using bedrails: A little  Moving from lying on your back to sitting on the side of a flat bed without using bedrails: A little  Moving to and from bed to chair (including a wheelchair): A lot  Standing up from a chair using your arms (e.g. wheelchair or bedside chair): A lot  To walk in hospital room: A lot  Climbing 3-5 steps with railing: Total  Basic Mobility - Total Score: 13                            Goals:  Encounter Problems       Encounter Problems (Active)       Impaired mobility s/p TING        Perform all bed mobility with indep.        Start:  07/17/24    Expected End:  07/31/24            Perform all transfers with ww and mod. indep.        Start:  07/17/24    Expected End:  07/31/24            Patient will ambulate >/= 100 ft with ww and mod. indep.        Start:  07/17/24    Expected End:  07/31/24            Patient will perform LE HEP with indep. to improve strength and functional mobility  (Progressing)       Start:  07/17/24    Expected End:  07/31/24                 Education Documentation  Precautions, taught by Chitra Bassett, PT at 7/17/2024  3:43 PM.  Learner: Patient  Readiness: Acceptance  Method: Explanation, Demonstration, Handout  Response: Verbalizes Understanding  Comment: see note    Home Exercise Program, taught by Chitra Bassett, PT at 7/17/2024  3:43 PM.  Learner: Patient  Readiness: Acceptance  Method: Explanation, Demonstration, Handout  Response: Verbalizes Understanding  Comment: see note    Education Comments  No comments found.

## 2024-07-17 NOTE — ANESTHESIA PROCEDURE NOTES
Spinal Block    Start time: 7/17/2024 8:58 AM  End time: 7/17/2024 9:02 AM  Reason for block: primary anesthetic  Staffing  Performed: attending   Authorized by: Nils Galindo MD    Performed by: Nils Galindo MD    Preanesthetic Checklist  Completed: patient identified, IV checked, site marked, risks and benefits discussed, surgical consent, monitors and equipment checked, pre-op evaluation, timeout performed and sterile techniques followed  Block Timeout  RN/Licensed healthcare professional reads aloud to the Anesthesia provider and entire team: Patient identity, procedure with side and site, patient position, and as applicable the availability of implants/special equipment/special requirements.  Patient on coagulant treatment: no  Timeout performed at: 7/17/2024 8:56 AM  Spinal Block  Patient position: sitting  Prep: Betadine  Sterility prep: cap, drape, gloves, hand hygiene and mask  Sedation level: light sedation  Patient monitoring: continuous pulse oximetry and heart rate  Approach: midline  Vertebral space: L3-4  Injection technique: single-shot  Needle  Needle type: pencil-point   Needle gauge: 24 G  Needle length: 4 in  Free flowing CSF: yes    Assessment  Sensory level: T10 bilateral  Block outcome: pain improved  Procedure assessment: patient sedated but conversant throughout procedure and patient tolerated procedure well with no immediate complications

## 2024-07-18 ENCOUNTER — DOCUMENTATION (OUTPATIENT)
Dept: HOME HEALTH SERVICES | Facility: HOME HEALTH | Age: 49
End: 2024-07-18
Payer: MEDICARE

## 2024-07-18 ENCOUNTER — PHARMACY VISIT (OUTPATIENT)
Dept: PHARMACY | Facility: CLINIC | Age: 49
End: 2024-07-18
Payer: COMMERCIAL

## 2024-07-18 VITALS
BODY MASS INDEX: 33.51 KG/M2 | DIASTOLIC BLOOD PRESSURE: 50 MMHG | WEIGHT: 177.47 LBS | SYSTOLIC BLOOD PRESSURE: 92 MMHG | HEIGHT: 61 IN | RESPIRATION RATE: 18 BRPM | TEMPERATURE: 98.2 F | OXYGEN SATURATION: 93 % | HEART RATE: 55 BPM

## 2024-07-18 PROBLEM — M16.12 PRIMARY LOCALIZED OSTEOARTHRITIS OF LEFT HIP: Status: RESOLVED | Noted: 2024-07-17 | Resolved: 2024-07-18

## 2024-07-18 PROBLEM — M16.12 UNILATERAL PRIMARY OSTEOARTHRITIS, LEFT HIP: Status: RESOLVED | Noted: 2024-06-14 | Resolved: 2024-07-18

## 2024-07-18 LAB
ANION GAP SERPL CALC-SCNC: 9 MMOL/L (ref 10–20)
BUN SERPL-MCNC: 14 MG/DL (ref 6–23)
CALCIUM SERPL-MCNC: 7.7 MG/DL (ref 8.6–10.3)
CHLORIDE SERPL-SCNC: 109 MMOL/L (ref 98–107)
CO2 SERPL-SCNC: 24 MMOL/L (ref 21–32)
CREAT SERPL-MCNC: 0.78 MG/DL (ref 0.5–1.05)
EGFRCR SERPLBLD CKD-EPI 2021: >90 ML/MIN/1.73M*2
ERYTHROCYTE [DISTWIDTH] IN BLOOD BY AUTOMATED COUNT: 14 % (ref 11.5–14.5)
GLUCOSE SERPL-MCNC: 106 MG/DL (ref 74–99)
HCT VFR BLD AUTO: 29.1 % (ref 36–46)
HGB BLD-MCNC: 9.5 G/DL (ref 12–16)
MCH RBC QN AUTO: 30.7 PG (ref 26–34)
MCHC RBC AUTO-ENTMCNC: 32.6 G/DL (ref 32–36)
MCV RBC AUTO: 94 FL (ref 80–100)
NRBC BLD-RTO: 0 /100 WBCS (ref 0–0)
PLATELET # BLD AUTO: 173 X10*3/UL (ref 150–450)
POTASSIUM SERPL-SCNC: 4 MMOL/L (ref 3.5–5.3)
RBC # BLD AUTO: 3.09 X10*6/UL (ref 4–5.2)
SODIUM SERPL-SCNC: 138 MMOL/L (ref 136–145)
WBC # BLD AUTO: 6.4 X10*3/UL (ref 4.4–11.3)

## 2024-07-18 PROCEDURE — 97110 THERAPEUTIC EXERCISES: CPT | Mod: GP,CQ

## 2024-07-18 PROCEDURE — 97530 THERAPEUTIC ACTIVITIES: CPT | Mod: GP,CQ

## 2024-07-18 PROCEDURE — 2500000004 HC RX 250 GENERAL PHARMACY W/ HCPCS (ALT 636 FOR OP/ED)

## 2024-07-18 PROCEDURE — 2500000001 HC RX 250 WO HCPCS SELF ADMINISTERED DRUGS (ALT 637 FOR MEDICARE OP): Performed by: ORTHOPAEDIC SURGERY

## 2024-07-18 PROCEDURE — 2500000005 HC RX 250 GENERAL PHARMACY W/O HCPCS: Performed by: ORTHOPAEDIC SURGERY

## 2024-07-18 PROCEDURE — 97165 OT EVAL LOW COMPLEX 30 MIN: CPT | Mod: GO

## 2024-07-18 PROCEDURE — 85027 COMPLETE CBC AUTOMATED: CPT | Performed by: ORTHOPAEDIC SURGERY

## 2024-07-18 PROCEDURE — 36415 COLL VENOUS BLD VENIPUNCTURE: CPT | Performed by: ORTHOPAEDIC SURGERY

## 2024-07-18 PROCEDURE — 80048 BASIC METABOLIC PNL TOTAL CA: CPT | Performed by: ORTHOPAEDIC SURGERY

## 2024-07-18 PROCEDURE — 7100000011 HC EXTENDED STAY RECOVERY HOURLY - NURSING UNIT

## 2024-07-18 PROCEDURE — 2500000002 HC RX 250 W HCPCS SELF ADMINISTERED DRUGS (ALT 637 FOR MEDICARE OP, ALT 636 FOR OP/ED): Performed by: ORTHOPAEDIC SURGERY

## 2024-07-18 PROCEDURE — RXMED WILLOW AMBULATORY MEDICATION CHARGE

## 2024-07-18 PROCEDURE — 97116 GAIT TRAINING THERAPY: CPT | Mod: GP,CQ

## 2024-07-18 PROCEDURE — 2500000004 HC RX 250 GENERAL PHARMACY W/ HCPCS (ALT 636 FOR OP/ED): Performed by: ORTHOPAEDIC SURGERY

## 2024-07-18 RX ORDER — OXYCODONE HYDROCHLORIDE 5 MG/1
5 TABLET ORAL EVERY 6 HOURS PRN
Qty: 28 TABLET | Refills: 0 | Status: SHIPPED | OUTPATIENT
Start: 2024-07-18 | End: 2024-07-26

## 2024-07-18 RX ORDER — ASPIRIN 81 MG/1
81 TABLET ORAL 2 TIMES DAILY
Qty: 60 TABLET | Refills: 0 | Status: SHIPPED | OUTPATIENT
Start: 2024-07-18 | End: 2024-08-17

## 2024-07-18 RX ORDER — CYCLOBENZAPRINE HCL 10 MG
10 TABLET ORAL 3 TIMES DAILY PRN
Qty: 21 TABLET | Refills: 0 | Status: SHIPPED | OUTPATIENT
Start: 2024-07-18 | End: 2024-07-26

## 2024-07-18 RX ORDER — DOCUSATE SODIUM 100 MG/1
100 CAPSULE, LIQUID FILLED ORAL 2 TIMES DAILY
Qty: 20 CAPSULE | Refills: 0 | Status: SHIPPED | OUTPATIENT
Start: 2024-07-18 | End: 2024-07-28

## 2024-07-18 RX ORDER — ACETAMINOPHEN 325 MG/1
650 TABLET ORAL EVERY 6 HOURS SCHEDULED
Qty: 56 TABLET | Refills: 0 | Status: SHIPPED | OUTPATIENT
Start: 2024-07-18 | End: 2024-07-26

## 2024-07-18 ASSESSMENT — COGNITIVE AND FUNCTIONAL STATUS - GENERAL
TURNING FROM BACK TO SIDE WHILE IN FLAT BAD: A LITTLE
MOVING FROM LYING ON BACK TO SITTING ON SIDE OF FLAT BED WITH BEDRAILS: A LITTLE
MOBILITY SCORE: 18
DRESSING REGULAR LOWER BODY CLOTHING: A LITTLE
STANDING UP FROM CHAIR USING ARMS: A LITTLE
WALKING IN HOSPITAL ROOM: A LITTLE
CLIMB 3 TO 5 STEPS WITH RAILING: A LITTLE
PERSONAL GROOMING: A LITTLE
DAILY ACTIVITIY SCORE: 20
MOVING TO AND FROM BED TO CHAIR: A LITTLE
HELP NEEDED FOR BATHING: A LITTLE
TOILETING: A LITTLE

## 2024-07-18 ASSESSMENT — PAIN DESCRIPTION - ORIENTATION: ORIENTATION: LEFT

## 2024-07-18 ASSESSMENT — PAIN SCALES - GENERAL
PAIN_LEVEL: 0
PAINLEVEL_OUTOF10: 7
PAINLEVEL_OUTOF10: 3
PAINLEVEL_OUTOF10: 2
PAINLEVEL_OUTOF10: 8
PAINLEVEL_OUTOF10: 6
PAINLEVEL_OUTOF10: 6

## 2024-07-18 ASSESSMENT — PAIN - FUNCTIONAL ASSESSMENT
PAIN_FUNCTIONAL_ASSESSMENT: 0-10

## 2024-07-18 ASSESSMENT — PAIN DESCRIPTION - LOCATION: LOCATION: HIP

## 2024-07-18 ASSESSMENT — ACTIVITIES OF DAILY LIVING (ADL): BATHING_ASSISTANCE: STAND BY

## 2024-07-18 ASSESSMENT — PAIN DESCRIPTION - DESCRIPTORS: DESCRIPTORS: ACHING

## 2024-07-18 NOTE — ANESTHESIA POSTPROCEDURE EVALUATION
Patient: Nati Arauz    Procedure Summary       Date: 07/17/24 Room / Location: [facility] 12 / Virtual [facility]    Anesthesia Start: 0855 Anesthesia Stop: 1046    Procedure: Arthroplasty Total Hip Lateral Approach (Left: Hip) Diagnosis:       Unilateral primary osteoarthritis, left hip      (Unilateral primary osteoarthritis, left hip [M16.12])    Surgeons: Amando Spencer MD Responsible Provider: Nils Galindo MD    Anesthesia Type: spinal ASA Status: 2            Anesthesia Type: spinal    Vitals Value Taken Time   /67 07/17/24 1133   Temp 36 °C (96.8 °F) 07/17/24 1113   Pulse 51 07/17/24 1133   Resp 18 07/17/24 1133   SpO2 100 % 07/17/24 1133       Anesthesia Post Evaluation    Patient location during evaluation: PACU  Patient participation: complete - patient participated  Level of consciousness: awake  Pain score: 0  Pain management: adequate  Multimodal analgesia pain management approach  Airway patency: patent  Cardiovascular status: acceptable and hemodynamically stable  Respiratory status: acceptable, nonlabored ventilation and room air  Hydration status: acceptable  Postoperative Nausea and Vomiting: none        No notable events documented.

## 2024-07-18 NOTE — HH CARE COORDINATION
Home Care received a Referral for Physical Therapy and Occupational Therapy. We have processed the referral for a Start of Care on 07/19/2024.     If you have any questions or concerns, please feel free to contact us at [phone]. Follow the prompts, enter your five digit zip code, and you will be directed to your care team on [facility] 1.

## 2024-07-18 NOTE — PROGRESS NOTES
Hip surgery    Progress Note    Subjective:     Post-Operative Day # 1   Status Post left Hip Arthroplasty    Systemic or Specific Complaints: No Complaints    Objective:     Visit Vitals  BP (!) 75/42 (BP Location: Left arm, Patient Position: Lying)   Pulse 55   Temp 36.8 °C (98.2 °F) (Temporal)   Resp 18       General: alert and oriented, in no acute distress, appears stated age, and cooperative   Wound: no erythema, no edema, no drainage, and dressing is clean, dry, and intact   Motion: Painful range of Motion   DVT Exam: No evidence of DVT seen on physical exam.  Negative Carmine's sign.  No significant calf/ankle edema.       NVI in lower extremity. left thigh soft to palpation. Strong equal dorsi/plantar flexion bilaterally. Good distal pulses bilaterally.      Data Review  Recent Results (from the past 24 hour(s))   CBC    Collection Time: 07/18/24  5:33 AM   Result Value Ref Range    WBC 6.4 4.4 - 11.3 x10*3/uL    nRBC 0.0 0.0 - 0.0 /100 WBCs    RBC 3.09 (L) 4.00 - 5.20 x10*6/uL    Hemoglobin 9.5 (L) 12.0 - 16.0 g/dL    Hematocrit 29.1 (L) 36.0 - 46.0 %    MCV 94 80 - 100 fL    MCH 30.7 26.0 - 34.0 pg    MCHC 32.6 32.0 - 36.0 g/dL    RDW 14.0 11.5 - 14.5 %    Platelets 173 150 - 450 x10*3/uL   Basic metabolic panel    Collection Time: 07/18/24  5:33 AM   Result Value Ref Range    Glucose 106 (H) 74 - 99 mg/dL    Sodium 138 136 - 145 mmol/L    Potassium 4.0 3.5 - 5.3 mmol/L    Chloride 109 (H) 98 - 107 mmol/L    Bicarbonate 24 21 - 32 mmol/L    Anion Gap 9 (L) 10 - 20 mmol/L    Urea Nitrogen 14 6 - 23 mg/dL    Creatinine 0.78 0.50 - 1.05 mg/dL    eGFR >90 >60 mL/min/1.73m*2    Calcium 7.7 (L) 8.6 - 10.3 mg/dL     XR pelvis 1-2 views    Result Date: 7/17/2024  Interpreted By:  Ольга Fagan, STUDY: XR PELVIS 1-2 VIEWS; ;  7/17/2024 11:18 am   INDICATION: Signs/Symptoms:Post op hip.   COMPARISON: 02/13/2024 left hip   ACCESSION NUMBER(S): [id number]   ORDERING CLINICIAN: KATHERIN MEADE   FINDINGS: AP view of  the low pelvis and proximal femurs shows a left hip endoprosthesis in place. No fracture is noted in the visible native bones. Right hip joint space is mildly narrowed.       Left hip endoprosthesis placement     MACRO: None   Signed by: Ольга Fagan 7/17/2024 11:26 AM Dictation workstation:   [id number]    ECG 12 Lead    Result Date: 7/11/2024  Sinus bradycardia with short [place] with Premature supraventricular complexes Otherwise normal ECG No previous ECGs available Confirmed by Johan Finn ([id number]) on 7/11/2024 3:41:54 PM      Assessment:     Status Post left Hip Arthroplasty. Doing well postoperatively. No acute events overnight.     Acute postoperative pain: Reports mild to moderate pain to operative extremity. Exacerbated by movement, relieved by rest ice and pain medication. Continue current pain management.     Acute postoperative blood loss anemia: Secondary to expected surgical blood loss. Hemoglobin this A.M. 9.5.  Patient asymptomatic, and remains hemodynamically stable with no signs of active bleeding.    Patient hypotensive this AM SBP 70's while lying in bed. She does report feeling slightly dizzy when she was sitting at edge of bed. Will give 500 ml bolus and monitor.      Plan:      1.  Continue current post op course  2.  Continue Deep venous thrombosis prophylaxis: Aspirin 81 mg BID for 30 days  3.  Continue physical therapy: Weight-bearing as tolerated with anterior hip precautions to operative extremity  4.  Continue Pain Control: scripts sent  5.  Discharge planning: patient plans to return to home with  home care at discharge, orders placed. SW and TCC following for discharge planning. Anticipate discharge POD 1-2 pending BP/dizziness improves.       PERLITA Paige   7/18/2024 9:27 AM

## 2024-07-18 NOTE — PROGRESS NOTES
07/18/24 0901   Discharge Planning   Home or Post Acute Services In home services   Type of Home Care Services Home OT;Home PT   Expected Discharge Disposition HH Services     Pt is s/p POD #1 Elective left total hip arthroplasty lateral approach on 7/17/24 with Dr. Amando Spencer. Pt is weight bearing as tolerated with Anterior hip precautions. AMPAC scores are PT (13) OT (pending) and recommending continued therapy at low level/intensity. Pt states DC preference remains home with [facility]. Demographics verified. [facility]  notified of [facility] referral/HCO in Epic and confirms received and processed for next day SOC.

## 2024-07-18 NOTE — DISCHARGE SUMMARY
Discharge summary  This patient Nati Arauz was admitted to the hospital on 7/17/2024  after undergoing Procedure(s) (LRB):  Arthroplasty Total Hip Lateral Approach (Left) without complications that morning.    During the postoperative period,while in hospital, patient was medically managed by the hospitalist. Please see medial notes and H&P for patients additional diagnoses.  Ortho agrees with all medical diagnoses and treatments while patient in hospital.  No significant or unexpected findings or abnormal ortho imaging were noted during the hospital stay    Hospital course      Patient tolerated surgical procedure well and there was no complications. Patient progressed adequately through their recovery during hospital stay including PT and rehabilitation.    Patient was then D/C on  to home  in stable condition.  Patient was instructed on the use of pain medications, the signs and symptoms of infection, and was given our number to call should they have any questions or concerns following discharge.    Based on my clinical judgment, the patient was provided with a 7-day prescription for opioid medication at 30 MED, indicated for treatment of acute pain in the setting of recent surgery. OARRS report was run and has demonstrated an appropriate time course.  The patient has been provided with counseling pertaining to safe use of opioid medication.    Patient may WBAT with anterior hip precautions to operative extremity with use of walker for assistance with ambulation   Mepilex dressing to be removed POD#7 and incision left open to air  Aspirin 81 mg BID for DVT prophylaxis started on 7/17/24 and to be taken for 30 days  Follow up with surgeon in 2 weeks

## 2024-07-18 NOTE — PROGRESS NOTES
Physical Therapy    Physical Therapy Treatment    Patient Name: Nati Arauz  MRN: [id number]  Today's Date: 7/18/2024  Time Calculation  Start Time: 0908  Stop Time: 1004  Time Calculation (min): 56 min     607/607-A    Assessment/Plan   PT Assessment  PT Assessment Results: Decreased endurance, Decreased strength, Decreased mobility, Orthopedic restrictions  Rehab Prognosis: Good  End of Session Communication: Bedside nurse  Assessment Comment: pt is participating well and progressing towards goals  End of Session Patient Position: Up in chair, Alarm on     Treatment/Interventions: Transfer training, Gait training, Therapeutic exercise, Therapeutic activity, Home exercise program  PT Plan: Ongoing PT  PT Frequency: BID  PT Discharge Recommendations: Low intensity level of continued care  Equipment Recommended upon Discharge: Wheeled walker   PT Recommended Transfer Status: Assist x1, Assistive device    General Visit Information:   PT  Visit  PT Received On: 07/18/24  General  Reason for Referral: L THR  Family/Caregiver Present: No  Prior to Session Communication: Bedside nurse (cleared to participate)  Patient Position Received: Up in chair, Alarm on  General Comment: agreeable to participate, denies any home going concerns, states here spouse and daughter will be available to help    General Observations:   General Observation: Review of anterior hip precautions and handout issued. LE HEP handout issued and ther-ex initiated: APs, L QS, GS, L heel slides AAROM, L LAQ    Subjective     Precautions:  Precautions  LE Weight Bearing Status: Weight Bearing as Tolerated  Medical Precautions: Fall precautions  Post-Surgical Precautions: Left hip precautions (anterior hip precautions)  Precautions Comment: reviewed hip precautions, pt able to recall and maintain    Vital Signs:     Objective     Pain:  Pain Assessment  Pain Assessment: 0-10  0-10 (Numeric) Pain Score: 6 (decreased to 2/10 after treatment with CP in  place)  Pain Type: Acute pain, Surgical pain  Pain Location: Hip  Pain Orientation: Left  Pain Descriptors: Aching (pulling)  Pain Interventions: Cold applied    Cognition:  Cognition  Overall Cognitive Status: Within Functional Limits        Activity Tolerance:  Activity Tolerance  Endurance: Endurance does not limit participation in activity    Treatments:  Therapeutic Exercise  Therapeutic Exercise Performed: Yes  Therapeutic Exercise Activity 1: hip precautions maintianed while Pt performed supine ther ex including ankle pumps, quad sets, glut sets, heel slides, AAROM hip ABD, SAQ 10-20 reps.  Therapeutic Exercise Activity 2: hip precautions maintained while Pt performed seated ther ex  including LAQ, ABD/ADD pillow squeeze and Heel/toe raises 10-20 reps. Issued HEP for discharge. Verbal and tactile cues for technique and breathing.  Bed Mobility  Bed Mobility: No  Ambulation/Gait Training  Ambulation/Gait Training Performed: Yes (ambulating 50ft with WW amd CGA using slow step to gait pattern with vc to increase CHAIM and upright posture)  Transfers  Transfer: Yes  Transfer 1  Technique 1: Sit to stand, Stand to sit  Trials/Comments 1: transfers sit<--> stand with WW and CGA with good safety awareness  Stairs  Stairs: No (pt states she has a ramp and uses a family members scooter to get in the house)       Outcome Measures:  [facility] Basic Mobility  Turning from your back to your side while in a flat bed without using bedrails: A little  Moving from lying on your back to sitting on the side of a flat bed without using bedrails: A little  Moving to and from bed to chair (including a wheelchair): A little  Standing up from a chair using your arms (e.g. wheelchair or bedside chair): A little  To walk in hospital room: A little  Climbing 3-5 steps with railing: A little  Basic Mobility - Total Score: 18        EDUCATION:    Individual(s) Educated: Patient  Education Provided: Body Mechanics, Fall Risk, Home Exercise  Program, Post-Op Precautions  Patient Response to Education: Patient/Caregiver Verbalized Understanding of Information    Encounter Problems       Encounter Problems (Active)       Impaired mobility s/p TING        Perform all bed mobility with indep.  (Progressing)       Start:  07/17/24    Expected End:  07/31/24            Perform all transfers with ww and mod. indep.  (Progressing)       Start:  07/17/24    Expected End:  07/31/24            Patient will ambulate >/= 100 ft with ww and mod. indep.  (Progressing)       Start:  07/17/24    Expected End:  07/31/24            Patient will perform LE HEP with indep. to improve strength and functional mobility  (Progressing)       Start:  07/17/24    Expected End:  07/31/24               Pain - Adult

## 2024-07-18 NOTE — PROGRESS NOTES
Occupational Therapy    Evaluation/Treatment    Patient Name: Nati Arauz  MRN: [id number]  : 1975  Today's Date: 24  Time Calculation  Start Time: 0840  Stop Time: 0900  Time Calculation (min): 20 min       Assessment:  End of Session Patient Position: Up in chair, Alarm on (call light in reach)       Plan:  No Skilled OT: No acute OT goals identified  OT Frequency: OT eval only  OT Discharge Recommendations: No OT needed after discharge, No further acute OT  OT Recommended Transfer Status: Stand by assist  OT - OK to Discharge: Yes     Subjective         General:   OT Received On: 24  General  Reason for Referral: L TING 24  Referred By: Tj   Past Medical History Relevant to Rehab: morbid obesity, OA, current smoker, hysterectomy  Family/Caregiver Present: No  Patient Position Received: Bed, 2 rail up  General Comment: L hip OA; admit for elective L TING with Dr. Spencer 24    Precautions:  LE Weight Bearing Status:  (WBAT L LE)  Medical Precautions: Fall precautions  Post-Surgical Precautions: Left hip precautions (anterior)           Pain:  Pain Assessment  Pain Assessment: 0-10  0-10 (Numeric) Pain Score: 7  Pain Type: Acute pain, Surgical pain  Pain Location: Hip  Pain Orientation: Left  Pain Interventions: Cold applied  Objective     Cognition:  Overall Cognitive Status: Within Functional Limits             Home Living:  Home Living Comments: Lives with spouse and 2 adult children in 2 story home with ramp to enter. 1st floor set up available with bed and full bath; walk-in shower with seat and GBs. Pt. owns 2ww, cane, BSC and electric scooter. (Father-in-law lived with pt. and passed away 1 week ago).    Prior Function:  Prior Function Comments: Mod. indep. amb. with cane for short distances; electric scooter for longer distances. 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 No recent falls. +drives.           Activities of Daily Living:   Eating Assistance:  Independent  Grooming Assistance: Stand by  Bathing Assistance: Stand by  UE Dressing Assistance: Independent  LE Dressing Assistance: Stand by  LE Dressing Deficit:  (to don underwear and pants with SBA)  Toileting Deficit:  (CGA)  Functional Assistance:  (pt ambulated with CGA 20' in room)                         Activity Tolerance:  Endurance: Endurance does not limit participation in activity           Bed Mobility/Transfers: Bed Mobility  Bed Mobility:  (sup to sit CGA, R LE support)  Transfers  Transfer:  (sit to stand from EOB SBA, toilet transfer SBA)                Balance:  Static Sitting: good  Dynamic Sitting: fair+  Static Standing: fair+  Dynamic Standing: fair          Vision:Vision - Basic Assessment  Current Vision: No visual deficits               Strength:  Strength Comments: B UE 4/5 throughout            Extremities: RUE   RUE : Within Functional Limits and LUE   LUE: Within Functional Limits    Outcome Measures: [facility] Daily Activity  Putting on and taking off regular lower body clothing: A little  Bathing (including washing, rinsing, drying): A little  Putting on and taking off regular upper body clothing: None  Toileting, which includes using toilet, bedpan or urinal: A little  Taking care of personal grooming such as brushing teeth: A little  Eating Meals: None  Daily Activity - Total Score: 20    Education Documentation  ADL Training, taught by Martha Lo OT at 7/18/2024 11:27 AM.  Learner: Patient  Readiness: Acceptance  Method: Explanation  Response: Verbalizes Understanding, Demonstrated Understanding    Education Comments  No comments found.        EDUCATION:  Education  Education Comment: OT educated pt on anterior hip precautions, LB dressing strategies and safe toilet transfers. Pt receptive to education, demo good safety and understanding.    Goals:

## 2024-07-19 ENCOUNTER — HOME CARE VISIT (OUTPATIENT)
Dept: HOME HEALTH SERVICES | Facility: HOME HEALTH | Age: 49
End: 2024-07-19
Payer: MEDICARE

## 2024-07-19 VITALS
TEMPERATURE: 98.9 F | SYSTOLIC BLOOD PRESSURE: 100 MMHG | DIASTOLIC BLOOD PRESSURE: 58 MMHG | HEIGHT: 62 IN | WEIGHT: 178 LBS | BODY MASS INDEX: 32.76 KG/M2 | HEART RATE: 72 BPM

## 2024-07-19 VITALS — SYSTOLIC BLOOD PRESSURE: 89 MMHG | HEART RATE: 64 BPM | DIASTOLIC BLOOD PRESSURE: 53 MMHG

## 2024-07-19 PROCEDURE — G0151 HHCP-SERV OF PT,EA 15 MIN: HCPCS

## 2024-07-19 PROCEDURE — G0152 HHCP-SERV OF OT,EA 15 MIN: HCPCS

## 2024-07-19 SDOH — HEALTH STABILITY: PHYSICAL HEALTH
EXERCISE COMMENTS: INSTRUCTED IN AND GIVEN WRITTEN PROGRAM. SUPINE ANKLE P DORSI, QUAD AND GLUT ISOMET, HEEL SLIDES, TKE. STAND AT SINK L HIP FLEX, ABD, HEEL RAISES

## 2024-07-19 ASSESSMENT — ENCOUNTER SYMPTOMS
PAIN SEVERITY GOAL: 2/10
HIGHEST PAIN SEVERITY IN PAST 24 HOURS: 9/10
PAIN LOCATION: LEFT HIP
PAIN LOCATION: LEFT LEG
NAUSEA: 1
HIGHEST PAIN SEVERITY IN PAST 24 HOURS: 9/10
PAIN: 1
LOWEST PAIN SEVERITY IN PAST 24 HOURS: 4/10
SUBJECTIVE PAIN PROGRESSION: UNCHANGED
PAIN: 1
PAIN LOCATION - RELIEVING FACTORS: ICE, MEDS
PAIN SEVERITY GOAL: 2/10
SUBJECTIVE PAIN PROGRESSION: GRADUALLY IMPROVING
PAIN LOCATION - PAIN FREQUENCY: CONSTANT
PAIN LOCATION - PAIN QUALITY: THROBBING
PERSON REPORTING PAIN: PATIENT
PERSON REPORTING PAIN: PATIENT
LOWEST PAIN SEVERITY IN PAST 24 HOURS: 5/10
PAIN LOCATION - PAIN SEVERITY: 4/10

## 2024-07-19 ASSESSMENT — ACTIVITIES OF DAILY LIVING (ADL)
AMBULATION ASSISTANCE: SUPERVISION
TOILETING: INDEPENDENT
PHYSICAL TRANSFERS ASSESSED: 1
DRESSING_LB_CURRENT_FUNCTION: MINIMUM ASSIST
AMBULATION ASSISTANCE: 1
ENTERING_EXITING_HOME: STAND BY ASSIST
PHYSICAL_TRANSFERS_DEVICES: WW
TOILETING EQUIPMENT USED: BSC OVER TOILET
OASIS_M1830: 03
DRESSING_UB_CURRENT_FUNCTION: INDEPENDENT
CURRENT_FUNCTION: SUPERVISION
TOILETING: 1
AMBULATION ASSISTANCE ON FLAT SURFACES: 1

## 2024-07-19 NOTE — HOME HEALTH
pt lives alone in 2 storynhome, 2 short steps to enter. bedrm and full batrh on 1st flr. pt would prefer to sleep in her regular 2nd flr bedrm. pt id self. ambul with cane or used scooter for sveral weeks pre l santi [id number]. pt goal for p.t. is to go up stairs to use 2nd flr bedrm. to see dr perez [id number]. 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

## 2024-07-22 ENCOUNTER — HOME CARE VISIT (OUTPATIENT)
Dept: HOME HEALTH SERVICES | Facility: HOME HEALTH | Age: 49
End: 2024-07-22
Payer: MEDICARE

## 2024-07-22 VITALS
TEMPERATURE: 98.8 F | SYSTOLIC BLOOD PRESSURE: 90 MMHG | OXYGEN SATURATION: 98 % | DIASTOLIC BLOOD PRESSURE: 60 MMHG | HEART RATE: 68 BPM

## 2024-07-22 PROCEDURE — G0157 HHC PT ASSISTANT EA 15: HCPCS | Mod: CQ

## 2024-07-22 SDOH — HEALTH STABILITY: PHYSICAL HEALTH
EXERCISE COMMENTS: COMPLETED SUPINE AP, QS, GS, HAMSTRING SETS, HEEL SLIDES, HIP ROTATION AND STANDING HEEL RAISES, MARCHES, REINFORCEMENT FOR ALIGNMENT AND PACING, BREATHING OUT WITH EXERTION 15X

## 2024-07-22 ASSESSMENT — ENCOUNTER SYMPTOMS
PAIN LOCATION: LEFT HIP
HIGHEST PAIN SEVERITY IN PAST 24 HOURS: 8/10
PERSON REPORTING PAIN: PATIENT
PAIN: 1
PAIN LOCATION - PAIN SEVERITY: 3/10

## 2024-07-23 ENCOUNTER — TELEPHONE (OUTPATIENT)
Dept: PHYSICAL THERAPY | Facility: CLINIC | Age: 49
End: 2024-07-23
Payer: MEDICARE

## 2024-07-24 ENCOUNTER — HOME CARE VISIT (OUTPATIENT)
Dept: HOME HEALTH SERVICES | Facility: HOME HEALTH | Age: 49
End: 2024-07-24
Payer: MEDICARE

## 2024-07-24 VITALS
DIASTOLIC BLOOD PRESSURE: 64 MMHG | HEART RATE: 67 BPM | OXYGEN SATURATION: 97 % | TEMPERATURE: 99.1 F | SYSTOLIC BLOOD PRESSURE: 96 MMHG

## 2024-07-24 PROCEDURE — G0157 HHC PT ASSISTANT EA 15: HCPCS | Mod: CQ

## 2024-07-24 SDOH — HEALTH STABILITY: PHYSICAL HEALTH
EXERCISE COMMENTS: COMPLETED SUPINE AP, QS, GS, HS WITH PLASTIC BAG, HAMSTRING SETS, HIP ROTATION, STANDING HEEL RAISES, MARCHES, ALTERNATING KNEE FLEXION, MINI-SQUATS, 15 REPS WITH REINFORCEMENT FOR ALIGNMENT, PACING AND BREATHING OUT WITH EXERTION.

## 2024-07-24 ASSESSMENT — ENCOUNTER SYMPTOMS
PAIN: 1
PERSON REPORTING PAIN: PATIENT
PAIN LOCATION: LEFT HIP
HIGHEST PAIN SEVERITY IN PAST 24 HOURS: 5/10
PAIN LOCATION - PAIN SEVERITY: 4/10

## 2024-07-26 ENCOUNTER — OFFICE VISIT (OUTPATIENT)
Dept: PRIMARY CARE | Facility: CLINIC | Age: 49
End: 2024-07-26
Payer: MEDICARE

## 2024-07-26 VITALS
BODY MASS INDEX: 34.23 KG/M2 | HEART RATE: 61 BPM | HEIGHT: 62 IN | RESPIRATION RATE: 18 BRPM | OXYGEN SATURATION: 95 % | TEMPERATURE: 97.3 F | WEIGHT: 186 LBS

## 2024-07-26 DIAGNOSIS — T78.40XA ALLERGIC REACTION, INITIAL ENCOUNTER: Primary | ICD-10-CM

## 2024-07-26 DIAGNOSIS — Q82.8 DARIER'S DISEASE: ICD-10-CM

## 2024-07-26 PROCEDURE — 99213 OFFICE O/P EST LOW 20 MIN: CPT | Performed by: PHYSICIAN ASSISTANT

## 2024-07-26 PROCEDURE — 3008F BODY MASS INDEX DOCD: CPT | Performed by: PHYSICIAN ASSISTANT

## 2024-07-26 RX ORDER — FERROUS SULFATE 325(65) MG
325 TABLET ORAL
COMMUNITY

## 2024-07-26 RX ORDER — PREDNISONE 20 MG/1
TABLET ORAL
Qty: 18 TABLET | Refills: 0 | Status: SHIPPED | OUTPATIENT
Start: 2024-07-26

## 2024-07-26 NOTE — PROGRESS NOTES
"Subjective   Patient ID: Nati Arauz is a 49 y.o. female who presents for Rash (Pt here today for a rash on bilateral arms, back, front of neck, buttocks, thighs, legs that started 7/19; itches and like white little blisters, then it pops and clear puss comes out and then turns red. Recently started Aspirin, Oxycodone, Flexeril, and Colace in the hospital for hip replacement on 7/17. Spent overnight at the hospital so isn't sure if it was something in there.  ).    HPI     Trunk - scabbed papules scattered across her abdomen, back and on extremities     Review of Systems   Skin:  Positive for rash.       Objective   Pulse 61   Temp 36.3 °C (97.3 °F)   Resp 18   Ht 1.575 m (5' 2\")   Wt 84.4 kg (186 lb)   SpO2 95%   BMI 34.02 kg/m²     Physical Exam  Skin:     Findings: Rash (erythematous, scabbed and crusted, exoriaterd papules and plaques diffusely across trunk and scattered around extremities) present.         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   Will try steroid taper and see if that helps with sxs and pruritus   Offered referral to dermatologist - pt declined     "

## 2024-07-29 ENCOUNTER — HOME CARE VISIT (OUTPATIENT)
Dept: HOME HEALTH SERVICES | Facility: HOME HEALTH | Age: 49
End: 2024-07-29
Payer: MEDICARE

## 2024-07-29 VITALS — SYSTOLIC BLOOD PRESSURE: 86 MMHG | OXYGEN SATURATION: 96 % | DIASTOLIC BLOOD PRESSURE: 60 MMHG

## 2024-07-29 PROBLEM — Q82.8 DARIER'S DISEASE: Status: ACTIVE | Noted: 2024-07-29

## 2024-07-29 PROCEDURE — G0157 HHC PT ASSISTANT EA 15: HCPCS | Mod: CQ

## 2024-07-29 ASSESSMENT — ENCOUNTER SYMPTOMS
PAIN: 1
PAIN LOCATION: LEFT HIP
HIGHEST PAIN SEVERITY IN PAST 24 HOURS: 6/10
PAIN LOCATION - PAIN SEVERITY: 4/10
PERSON REPORTING PAIN: PATIENT

## 2024-07-30 SDOH — HEALTH STABILITY: PHYSICAL HEALTH
EXERCISE COMMENTS: SUPINE QS, GS, STANDING HEEL RAISE, HIP HIKING, MARCHES, ALTERNATING KNEE FLEXION, MINI-SQUATS WITH REINFORCEMENT FOR ALIGNMENT AND PACING, BREATHING OUT WITH EXERTION.  15 REPS.

## 2024-08-01 ENCOUNTER — HOME CARE VISIT (OUTPATIENT)
Dept: HOME HEALTH SERVICES | Facility: HOME HEALTH | Age: 49
End: 2024-08-01
Payer: MEDICARE

## 2024-08-01 VITALS — DIASTOLIC BLOOD PRESSURE: 58 MMHG | SYSTOLIC BLOOD PRESSURE: 102 MMHG

## 2024-08-01 PROCEDURE — G0157 HHC PT ASSISTANT EA 15: HCPCS | Mod: CQ

## 2024-08-01 ASSESSMENT — ENCOUNTER SYMPTOMS
HIGHEST PAIN SEVERITY IN PAST 24 HOURS: 5/10
PAIN: 1
PERSON REPORTING PAIN: PATIENT
PAIN LOCATION: LEFT HIP
PAIN LOCATION - PAIN SEVERITY: 2/10

## 2024-08-06 ENCOUNTER — APPOINTMENT (OUTPATIENT)
Dept: ORTHOPEDIC SURGERY | Facility: CLINIC | Age: 49
End: 2024-08-06
Payer: MEDICARE

## 2024-08-06 ENCOUNTER — HOSPITAL ENCOUNTER (OUTPATIENT)
Dept: RADIOLOGY | Facility: HOSPITAL | Age: 49
Discharge: HOME | End: 2024-08-06
Payer: MEDICARE

## 2024-08-06 DIAGNOSIS — Z96.642 S/P TOTAL LEFT HIP ARTHROPLASTY: ICD-10-CM

## 2024-08-06 DIAGNOSIS — Z96.642 S/P TOTAL LEFT HIP ARTHROPLASTY: Primary | ICD-10-CM

## 2024-08-06 PROCEDURE — 99024 POSTOP FOLLOW-UP VISIT: CPT | Performed by: ORTHOPAEDIC SURGERY

## 2024-08-06 PROCEDURE — 4004F PT TOBACCO SCREEN RCVD TLK: CPT | Performed by: ORTHOPAEDIC SURGERY

## 2024-08-06 PROCEDURE — 73502 X-RAY EXAM HIP UNI 2-3 VIEWS: CPT | Mod: LEFT SIDE | Performed by: RADIOLOGY

## 2024-08-06 PROCEDURE — 73502 X-RAY EXAM HIP UNI 2-3 VIEWS: CPT | Mod: LT

## 2024-08-06 ASSESSMENT — PAIN SCALES - GENERAL: PAINLEVEL_OUTOF10: 2

## 2024-08-06 ASSESSMENT — PAIN - FUNCTIONAL ASSESSMENT: PAIN_FUNCTIONAL_ASSESSMENT: 0-10

## 2024-08-06 NOTE — PROGRESS NOTES
No chief complaint on file.      The patient is here for follow-up of their side: left hip arthroplasty.  The patient has mild hip pain.  The patient has no mechanical symptoms.  The patient is approximately 3 weeks postop.    Physical examination:    Examination of the side: left hip  The incision is healing well  No erythema or warmth.  Range of motion: Forward Flexion: 120 Internal Rotation: 30 External Rotation: 30 Abduction 30  The Patient can single leg stand and actively abduct  Calf is soft, Homans negative  The patient has intact ankle dorsiflexion and plantarflexion.    Radiographs:  Left hip films to my interpretation show a total hip arthroplasty in satisfactory position.  The hip is reduced.  No fracture is identified.  No obvious loosening or wear is appreciated.        Impression:  Status post side: left total hip arthroplasty    Plan:  Outpatient physical therapy  Follow up in  9 weeks with XR   All questions answered

## 2024-08-07 ENCOUNTER — E-VISIT (OUTPATIENT)
Dept: ORTHOPEDIC SURGERY | Facility: CLINIC | Age: 49
End: 2024-08-07
Payer: MEDICARE

## 2024-08-07 ENCOUNTER — HOME CARE VISIT (OUTPATIENT)
Dept: HOME HEALTH SERVICES | Facility: HOME HEALTH | Age: 49
End: 2024-08-07
Payer: MEDICARE

## 2024-08-07 PROCEDURE — G0151 HHCP-SERV OF PT,EA 15 MIN: HCPCS

## 2024-08-07 SDOH — HEALTH STABILITY: PHYSICAL HEALTH: EXERCISE COMMENTS: IS INDEP WITH HEP. STAND AT SINK MARCH, HEEL RAISES, KNEE FLEX, PARTIAL SQUATS

## 2024-08-07 ASSESSMENT — ACTIVITIES OF DAILY LIVING (ADL)
AMBULATION ASSISTANCE ON FLAT SURFACES: 1
OASIS_M1830: 01
HOME_HEALTH_OASIS: 00
AMBULATION ASSISTANCE ON UNEVEN SURFACES: 1

## 2024-08-07 ASSESSMENT — ENCOUNTER SYMPTOMS
PAIN LOCATION: LEFT HIP
PERSON REPORTING PAIN: PATIENT
PAIN SEVERITY GOAL: 0/10
HIGHEST PAIN SEVERITY IN PAST 24 HOURS: 1/10
PAIN: 1
SUBJECTIVE PAIN PROGRESSION: RAPIDLY IMPROVING
LOWEST PAIN SEVERITY IN PAST 24 HOURS: 1/10

## 2024-08-08 NOTE — HOME HEALTH
reports compliance with hep. is using cane for all ambul, no longer walker. saw dr perez, got good report. to start out pt. p.t. [id number].

## 2024-08-12 NOTE — PROGRESS NOTES
Physical Therapy  Physical Therapy Evaluation    Patient Name: Nati Arauz  MRN: [id number]  Today's Date: 8/13/2024  Time Calculation  Start Time: 0243  Stop Time: 0314  Time Calculation (min): 31 min  PT Evaluation Time Entry  PT Evaluation (Low) Time Entry: 31                    Insurance:  COPAY 0 DED 0   Visit number: 1 of TBD  Authorization info: AUTH IS REQ AFTER IE   Insurance Type: [employer] BOA     General:  Reason for visit: s/p L TING   Referred by: Daniel Rivera PA-C     Current Problem:  Left hip pain  Precautions: Per MR  Precautions  Precautions Comment: L TING 7/17/24    Medical History Form: Reviewed (scanned into chart)    Subjective:   The patient is here for follow-up of their side: left hip arthroplasty.  The patient has mild hip pain.  The patient has no mechanical symptoms.  The patient is approximately 3 weeks postop. -Encounter note 8/6/24    Chief Complaint: Patient presents to clinic L hip dysfunction  Onset Date: 7/17/24  SIERRA: Chronic    Current Condition:   Better    Pain:  Pain Assessment: 0-10  0-10 (Numeric) Pain Score: 8 (Pain is intermittent-@ worst rates)  Pain Location: Hip  Pain Orientation: Left  Aggravating Factors:  Standing  Relieving Factors:  Rest    Relevant Information (PMH & Previous Tests/Imaging): XR 8/6/24  IMPRESSION:  Satisfactory appearance left hip arthroplasty.    Previous Interventions/Treatments: None    Prior Level of Function (PLOF)  Patient previously independent with all ADLs  Exercise/Physical Activity: NA  Work/School: on FMLA    Patients Living Environment: Reviewed and no concern    Primary Language: English     Patient's Goal(s) for Therapy: to be able to walk w/o a cane or difficulties.    Red Flags: Do you have any of the following? No  Fever/chills, unexplained weight changes, dizziness/fainting, unexplained change in bowel or bladder functions, unexplained malaise or muscle weakness, night pain/sweats, numbness or tingling    Objective:    Left  Hip AROM  * indicates Pain  FL 90  EX 5  IR  20  ER  20  AB 30  AD 0     Left Hip Strength (0/5)  * indicates Pain  FL 3  EX 4  IR 4-  ER 4-  AB 4  AD 4  Posture: WNL    Lower Extremity Functional Movements  Transfers: Ind  Gait: Ind      Outcome Measures:  Other Measures  Lower Extremity Funtional Score (LEFS): 28     EDUCATION: Pt educated in HEP, PT POC, anatomy, activity avoidance, proper positioning/posture, use of cold , pt demonstrates understanding of PT info, all questions answered.    HEP:    Goals: Set and discussed today  Increase ROM to WFL of L hip  Increase Strength where deficits noted by 1/3 to 1 mm grade of L hip  Ind w/ HEP to expedite progress and promote goal achievement.    Improve Outcome score  for evidence of improved function.  Return to PLOF   Decrease pain to 2/10 or less      Plan of care was developed with input and agreement by the patient.    Treatment Performed:    Eval Only     Assessment: 48 y/o F presents with c/o L hip dysfunction. Upon examination patient demonstrates moderate L hip impairment limiting overall functional mobility including amb. Activity limitations and participations restrictions include house chores. Pt to benefit from outpatient PT to address deficits, maximize functional mobility and improve QOL.  The clinical presentation of this patient is stable and their history and examination findings are consistent with a low complexity evaluation with good rehab potential.          Plan:     Planned Interventions include: therapeutic exercise, self-care home management, manual therapy, therapeutic activities, gait training, neuromuscular coordination, vasopneumatic, dry needling, aquatic therapy and modalities PRN.  Frequency: 2x/wk  Duration: 4weeks      Jose David Lockwood PT

## 2024-08-13 ENCOUNTER — EVALUATION (OUTPATIENT)
Dept: PHYSICAL THERAPY | Facility: CLINIC | Age: 49
End: 2024-08-13
Payer: MEDICARE

## 2024-08-13 DIAGNOSIS — M25.552 CHRONIC LEFT HIP PAIN: Primary | ICD-10-CM

## 2024-08-13 DIAGNOSIS — G89.29 CHRONIC LEFT HIP PAIN: Primary | ICD-10-CM

## 2024-08-13 PROCEDURE — 97161 PT EVAL LOW COMPLEX 20 MIN: CPT | Mod: GP | Performed by: PHYSICAL THERAPIST

## 2024-08-13 ASSESSMENT — PAIN SCALES - GENERAL: PAINLEVEL_OUTOF10: 8

## 2024-08-13 ASSESSMENT — ENCOUNTER SYMPTOMS
LOSS OF SENSATION IN FEET: 0
OCCASIONAL FEELINGS OF UNSTEADINESS: 1
DEPRESSION: 0

## 2024-08-13 ASSESSMENT — PAIN - FUNCTIONAL ASSESSMENT: PAIN_FUNCTIONAL_ASSESSMENT: 0-10

## 2024-08-29 ENCOUNTER — TELEPHONE (OUTPATIENT)
Dept: PHYSICAL THERAPY | Facility: CLINIC | Age: 49
End: 2024-08-29

## 2024-08-29 ENCOUNTER — TREATMENT (OUTPATIENT)
Dept: PHYSICAL THERAPY | Facility: CLINIC | Age: 49
End: 2024-08-29
Payer: MEDICARE

## 2024-08-29 DIAGNOSIS — M25.552 CHRONIC LEFT HIP PAIN: ICD-10-CM

## 2024-08-29 DIAGNOSIS — G89.29 CHRONIC LEFT HIP PAIN: ICD-10-CM

## 2024-08-29 PROCEDURE — 97110 THERAPEUTIC EXERCISES: CPT | Mod: GP,CQ

## 2024-08-29 ASSESSMENT — PAIN - FUNCTIONAL ASSESSMENT: PAIN_FUNCTIONAL_ASSESSMENT: 0-10

## 2024-08-29 ASSESSMENT — PAIN SCALES - GENERAL: PAINLEVEL_OUTOF10: 0 - NO PAIN

## 2024-08-29 NOTE — PROGRESS NOTES
"Physical Therapy Treatment    Patient Name: Nati Arauz  MRN: [id number]  Today's Date: 8/29/2024  Time Calculation  Start Time: 0250  Stop Time: 0330  Time Calculation (min): 40 min     PT Therapeutic Procedures Time Entry  Therapeutic Exercise Time Entry: 40                 Insurance:   COPAY 0 DED 0   Visit number: 1 of TBD  Authorization info: AUTH IS REQ AFTER IE   Insurance Type: [employer] BOA  [employer] APPROVED  8  PT VISITS - 8-29-24 THRU 10-4-24  AUTH# [id number]  [id number]  General:  Reason for visit: s/p L TING 7/17/24  Referred by: Daniel Rivera PA-C /Dr. Amando Spencer    Assessment:   Pt 6 weeks post-op TING. Progressed her program today w/ addition of supine and stdg hip/glute strengthening ex's and balance ex. Added YOAV as well today to improve her endurance w/ overall good ability.   Pt most challenged w/ SLR Abd dt hip/glute weakness and some discomfort/challenge w/ Clamshells however was able to complete ex/reps. Most challenged w/ SLS dt weakness LLE. She will cont to benefit from skilled PT to address her deficits and improve her overall functional ability.     Plan:   Cont to progress pt's program as ortiz.     Current Problem  1. Chronic left hip pain  Follow Up In Physical Therapy          Subjective   General  Referred By: Juanita Rivera reported \"at most\" over last week - 2/10. \"I had to ice it the other day because it was tight, likely from walking too much or over-doing it.\" Pt states she can do her stairs, stand, walk all w/o issue or pain.     Precautions  Precautions  Precautions Comment: L TING 7/17/24    Pain  Pain Assessment: 0-10  0-10 (Numeric) Pain Score: 0 - No pain  Pain Location: Hip  Pain Orientation: Left  Pain Interventions: Cold applied    Objective       Treatments:  Therapeutic Exercise (86416):  YOAV 5 min    *SLR Flex, Abd 2x10 ea  *Bridges x10  *Clamshells 2x10  Stdg Hip Abd  x15 ea  Mini Squats 2x10   *TR/HR 1/2 roll x20 ea  SLS 10\" x 10   EDUCATION:  Outpatient " Education  Education Provided: (P) Home Exercise Program  Access Code: [email or web address]  URL: [email or web address]  Date: 08/29/2024  Prepared by: Nelson Altamirano    Exercises  - Supine Bridge  - 1 x daily - 5-6 x weekly - 1 sets - 10 reps  - Clamshell  - 1 x daily - 5-6 x weekly - 2 sets - 10 reps  - Beginner Side Leg Lift  - 1 x daily - 5-6 x weekly - 1 sets - 10 reps  - Hooklying Straight Leg Raise  - 1 x daily - 5-6 x weekly - 1 sets - 10 reps  - Heel Toe Raises with Counter Support  - 1 x daily - 5-6 x weekly - 1 sets - 20 reps  - Heel Raise  - 1 x daily - 5-6 x weekly - 1 sets - 20 reps

## 2024-08-29 NOTE — PROGRESS NOTES
"Physical Therapy Treatment    Patient Name: Ntai Arauz  MRN: [id number]  Today's Date: 9/3/2024  Time Calculation  Start Time: 1220  Stop Time: 1300  Time Calculation (min): 40 min     PT Therapeutic Procedures Time Entry  Manual Therapy Time Entry: 8  Neuromuscular Re-Education Time Entry: 10  Therapeutic Exercise Time Entry: 20                 Current Problem  1. Chronic left hip pain  Follow Up In Physical Therapy          Insurance:  COPAY 0 DED 0   Visit number: 3/ 9  Authorization info: AUTH IS REQ AFTER IE   Insurance Type: [employer]  [employer] APPROVED  8  PT VISITS - 8-29-24 THRU 10-4-24  AUTH# [id number]  [id number]      Precautions   L TING 7/17/24 (lateral approach)    Subjective   Subjective:   Pt reports that her hip is doing pretty good. She doesn't really have pain. More stiffness and tightness, especially at night, or if she is walking around a lot. Has been doing stairs fine  Pain   0/10    Objective   Treatments:  Therapeutic Exercise (82368):  YOAV 5 min    *SLR Flex, Abd 2x10 ea  *Bridges x15  *Clamshells 2x10  Stdg Hip Abd ,flex YTB  2x10 ea  Mini Squats 2x10   *TR/HR 1/2 roll x20 ea   SLS 10\" x 5  Step ups F/L 4 in 2x10  Tap downs 4 in 10x    STM to glutes, scar 8'  OP EDUCATION:   YTB for standing hip abd/flex      Assessment:   Pt cont to have hip abd weakness. Amb eith trendelenburg gait. Still has difficulty maintaining correct form with s/l hip abd. Introduced tap downs which pt tended to allow the pelvis to drop. Added TB to standing hip ex. Good form with squats. Did well with step ups. Normal fatigue following therapy session.    Plan:   Cont with glute med strengthening.              "

## 2024-09-03 ENCOUNTER — TREATMENT (OUTPATIENT)
Dept: PHYSICAL THERAPY | Facility: CLINIC | Age: 49
End: 2024-09-03
Payer: MEDICARE

## 2024-09-03 DIAGNOSIS — G89.29 CHRONIC LEFT HIP PAIN: Primary | ICD-10-CM

## 2024-09-03 DIAGNOSIS — M25.552 CHRONIC LEFT HIP PAIN: Primary | ICD-10-CM

## 2024-09-03 PROCEDURE — 97140 MANUAL THERAPY 1/> REGIONS: CPT | Mod: GP,CQ

## 2024-09-03 PROCEDURE — 97112 NEUROMUSCULAR REEDUCATION: CPT | Mod: GP,CQ

## 2024-09-03 PROCEDURE — 97110 THERAPEUTIC EXERCISES: CPT | Mod: GP,CQ

## 2024-09-05 ENCOUNTER — TREATMENT (OUTPATIENT)
Dept: PHYSICAL THERAPY | Facility: CLINIC | Age: 49
End: 2024-09-05
Payer: MEDICARE

## 2024-09-05 DIAGNOSIS — G89.29 CHRONIC LEFT HIP PAIN: Primary | ICD-10-CM

## 2024-09-05 DIAGNOSIS — M25.552 CHRONIC LEFT HIP PAIN: Primary | ICD-10-CM

## 2024-09-05 PROCEDURE — 97110 THERAPEUTIC EXERCISES: CPT | Mod: GP | Performed by: PHYSICAL THERAPIST

## 2024-09-05 ASSESSMENT — PAIN SCALES - GENERAL: PAINLEVEL_OUTOF10: 0 - NO PAIN

## 2024-09-05 ASSESSMENT — PAIN - FUNCTIONAL ASSESSMENT: PAIN_FUNCTIONAL_ASSESSMENT: 0-10

## 2024-09-05 NOTE — PROGRESS NOTES
"Physical Therapy Treatment    Patient Name: Nati Arauz  MRN: [id number]  Today's Date: 9/5/2024  Time Calculation  Start Time: 0206  Stop Time: 0238  Time Calculation (min): 32 min     PT Therapeutic Procedures Time Entry  Therapeutic Exercise Time Entry: 32                 Current Problem  1. Chronic left hip pain  Follow Up In Physical Therapy          Insurance:  COPAY 0 DED 0   Visit number: 3/8  Authorization info: AUTH IS REQ AFTER IE   Insurance Type: [employer] BOA  [employer] APPROVED  8  PT VISITS - 8-29-24 THRU 10-4-24  AUTH# [id number]  [id number]/ALL    Precautions  Precautions  Precautions Comment: L TING 7/17/24L TING 7/17/24 (lateral approach)    Subjective   Subjective:  Reason for Referral: Briudge w/ Kick 2x10  Referred By: Srikanth Rivera reports that her hip is doing pretty good. She doesn't really have pain. More stiffness and tightness, especially at night, or if she is walking around a lot. Has been doing stairs fine  Pain  Pain Assessment: 0-10  0-10 (Numeric) Pain Score: 0 - No pain  Pain Location: Hip  Pain Orientation: Left0/10    Objective   Arrives late for appt.  Treatments:  Therapeutic Exercise (79789):  YOAV 5 min    *SLR Flex, Abd 2x10 ea  *Bridges x15  *Clamshells 2x10  Stdg Hip Abd ,flex YTB  2x10 ea (no band today)  Mini Squats 3x10   *TR/HR 1/2 roll x20 ea   SLS 10\" x 5---  Step ups F/L 4 in 20x  Heel Taps  4 in 20x  Prone SLR 3x10  Bridge w/ kick 2x10  Hooklying Unilateral Hip Flexion 3# cuff wt 2x10 BL      *=HEP  STM to glutes, scar 8'---  OP EDUCATION:   YTB for standing hip abd/flex      Assessment:  Pt able to tolerate tx session well this date w/ no adverse effects noted from tx.  Pt compliant w/ program and appears to understand rationale for therapy.  Still requires skilled therapy for increasing strength/ROM for performing ADLs.      Plan:  D/t noted impairments and dysfunction of  L hip, PT will cont to address deficits of strength and ROM via therapeutic exs and " modalities PRN and further assist w/ pain reduction.

## 2024-09-09 ENCOUNTER — TREATMENT (OUTPATIENT)
Dept: PHYSICAL THERAPY | Facility: CLINIC | Age: 49
End: 2024-09-09
Payer: MEDICARE

## 2024-09-09 DIAGNOSIS — G89.29 CHRONIC LEFT HIP PAIN: Primary | ICD-10-CM

## 2024-09-09 DIAGNOSIS — M25.552 CHRONIC LEFT HIP PAIN: Primary | ICD-10-CM

## 2024-09-09 PROCEDURE — 97110 THERAPEUTIC EXERCISES: CPT | Mod: GP,CQ

## 2024-09-09 ASSESSMENT — PAIN SCALES - GENERAL: PAINLEVEL_OUTOF10: 0 - NO PAIN

## 2024-09-09 ASSESSMENT — PAIN - FUNCTIONAL ASSESSMENT: PAIN_FUNCTIONAL_ASSESSMENT: 0-10

## 2024-09-09 NOTE — PROGRESS NOTES
"Physical Therapy Treatment    Patient Name: Nati Arauz  MRN: [id number]  Today's Date: 9/9/2024  Time Calculation  Start Time: 1215  Stop Time: 1258  Time Calculation (min): 43 min     PT Therapeutic Procedures Time Entry  Therapeutic Exercise Time Entry: 43                 Insurance:   COPAY 0 DED 0   Visit number: 4/8  Authorization info: AUTH IS REQ AFTER IE   Insurance Type: [employer] BOA  [employer] APPROVED  8  PT VISITS - 8-29-24 THRU 10-4-24  AUTH# [id number]  [id number]/ALL    General:  Reason for visit: s/p L TING   Referred by: Daniel Rivera PA-C      Current Problem:  Left hip pain  Precautions: Per MR  Precautions Comment: L TING 7/17/24     Assessment:   Pt demo'd overall good ability to complete today's ex's. She was most challenged on plinth w/ Bridges w/ kick, angie when WB through LLE. Emphasis on core activation and control w/ great f/t. Some muscle/tissue tension w/ 4\" tap downs however she could complete reps w/o c/o pain (20).   Assessed pt's ability to negotiate a flight of stairs - she is able to ascend and descend w/ min UE assistance however has more antalgia w/ ascending dt weakness. Good ability to ascend and descend reciprocally w/ min pain.     Plan:   D/t noted impairments and dysfunction of  L hip, PT will cont to address deficits of strength and ROM via therapeutic exs and modalities PRN and further assist w/ pain reduction.   MD bain 9/24 (Dr. Spencer).     Current Problem  1. Chronic left hip pain  Follow Up In Physical Therapy          Subjective   General  Referred By: Satya Rivera pain c/o today. Pt states the hip is \"feeling pretty good.\" Pt states the only thing she can't do right now that she'd like to be able to do is \"return to work.\"   Precautions  Precautions  Precautions Comment: L TING 7/17/24    Pain  Pain Assessment: 0-10  0-10 (Numeric) Pain Score: 0 - No pain    Objective       Treatments:  Therapeutic Exercise (00956):  YOAV 5 min    *SLR Flex, Abd 2x10 ea  *Bridges " "2x10  *Clamshells 2x10  Stdg Hip Abd ,flex YTB  2x10 ea (no band today)  Mini Squats 3x10   *TR/HR 1/2 roll x20 ea   SLS 10\" x 5---  Step ups F/L 4 in 20x (lat only)   Heel Taps  4 in 20x  Prone SLR 2x10  Bridge w/ kick 2x10  Hooklying Unilateral Hip Flexion 3# cuff wt 2x10 LLE  LAQ 3# x20   Flight of stairs 1x     Manual (84915): STM to glutes, scar 8'---    EDUCATION:         "

## 2024-09-11 ENCOUNTER — APPOINTMENT (OUTPATIENT)
Dept: PHYSICAL THERAPY | Facility: CLINIC | Age: 49
End: 2024-09-11
Payer: MEDICARE

## 2024-09-11 DIAGNOSIS — M25.552 CHRONIC LEFT HIP PAIN: Primary | ICD-10-CM

## 2024-09-11 DIAGNOSIS — G89.29 CHRONIC LEFT HIP PAIN: Primary | ICD-10-CM

## 2024-09-16 ENCOUNTER — TREATMENT (OUTPATIENT)
Dept: PHYSICAL THERAPY | Facility: CLINIC | Age: 49
End: 2024-09-16
Payer: MEDICARE

## 2024-09-16 DIAGNOSIS — M25.552 CHRONIC LEFT HIP PAIN: ICD-10-CM

## 2024-09-16 DIAGNOSIS — G89.29 CHRONIC LEFT HIP PAIN: ICD-10-CM

## 2024-09-16 PROCEDURE — 97110 THERAPEUTIC EXERCISES: CPT | Mod: GP,CQ

## 2024-09-16 ASSESSMENT — PAIN - FUNCTIONAL ASSESSMENT: PAIN_FUNCTIONAL_ASSESSMENT: 0-10

## 2024-09-16 ASSESSMENT — PAIN SCALES - GENERAL: PAINLEVEL_OUTOF10: 0 - NO PAIN

## 2024-09-16 NOTE — PROGRESS NOTES
"Physical Therapy Treatment    Patient Name: Nati Arauz  MRN: [id number]  Today's Date: 9/16/2024  Time Calculation  Start Time: 1130  Stop Time: 1210  Time Calculation (min): 40 min  PT Therapeutic Procedures Time Entry  Therapeutic Exercise Time Entry: 40       Current Problem  1. Chronic left hip pain  Follow Up In Physical Therapy          Subjective   General   Pt doing very well to this point. Denies too much in the way of pain.   Insurance   [facility]  Approved 8v   Date range 8/29/24-10/4/24  Visit 5  Precautions  Precautions  Precautions Comment: Anterior hip precautions  Pain  Pain Assessment: 0-10  0-10 (Numeric) Pain Score: 0 - No pain    Objective   Treatments:  Justin 10'  Bridges 2x10  SLR flex/abd 2x10 ea  Clamshells 2x10  LAQ 5# 2x10  Squats 2x10  HR/TR 1/2 roll x20 ea  Standing hip 3-way 2x10 ea  Step ups f/l 6\" 2x10 ea  Heel taps 4\" 2x10  Sidestepping YTB hi-lo 4L    Assessment   Able to continue ex's to address glute and quad strength to progress functional mobility. Is still with some quick fatigue onset glute med as well as generally weaker on L side. Did not progress much as she is still challenged with ex routine. She is doing overall better, but still with hip stability weaknesses limiting ability to return to occupation and functional activities requiring therapy to address.  Plan:  Progress with POC as tolerated.    OP EDUCATION:       Goals:     "

## 2024-09-17 NOTE — PROGRESS NOTES
"Physical Therapy Treatment    Patient Name: Nati Arauz  MRN: [id number]  Today's Date: 9/18/2024  Time Calculation  Start Time: 0203  Stop Time: 0246  Time Calculation (min): 43 min     PT Therapeutic Procedures Time Entry  Neuromuscular Re-Education Time Entry: 10  Therapeutic Exercise Time Entry: 30                 Current Problem  1. Chronic left hip pain  Follow Up In Physical Therapy          Insurance:  [employer]  Approved 8v   Date range 8/29/24-10/4/24  Visit 6  Precautions   : Anterior hip precautions     Subjective   Subjective:   Pt reports that she is feeling pretty good. She doesn't have any pain. She moved 4 bags of concrete yesterday, without any bad effects on her hip. Still has some tightness when trying to put shoes on.  Pain   0/10    Objective   Treatments:  Justin 6''  Bridges 2x10  SLR flex/abd 2x10 ea  SAQ 2# 20x*  Clamshells 2x10  LAQ 5# 2x10  Squats 2x10  HR/TR 1/2 roll x20 ea---  Standing hip 3-way 2x10 ea---  Step ups f/l 6\" 2x10 ea  Step overs 6 in 15x  Heel taps 4\" 2x10  Sidestepping RTB  4 steps x 6 laps  Marching on airex 15x  SLB airex 1'     OP EDUCATION:   Using tennis ball for piriformis massage      Assessment:   Pt is progressing well with her L LE strength. Good form used with squats. Introduced step overs, with proper eccentric control.Pt still challenged with s/l hip abd, as difficult for her to keep her knee extended while avoiding hip flexion. Displayed good balance with airex marching. SLB more challenging, as pt had a couple breaks in balance while on airex. Good overall completion of ex given.    Plan:   Cont with L quad and hip strengthening              "

## 2024-09-18 ENCOUNTER — TREATMENT (OUTPATIENT)
Dept: PHYSICAL THERAPY | Facility: CLINIC | Age: 49
End: 2024-09-18
Payer: MEDICARE

## 2024-09-18 DIAGNOSIS — M25.552 CHRONIC LEFT HIP PAIN: Primary | ICD-10-CM

## 2024-09-18 DIAGNOSIS — G89.29 CHRONIC LEFT HIP PAIN: Primary | ICD-10-CM

## 2024-09-18 PROCEDURE — 97110 THERAPEUTIC EXERCISES: CPT | Mod: GP,CQ

## 2024-09-18 PROCEDURE — 97112 NEUROMUSCULAR REEDUCATION: CPT | Mod: GP,CQ

## 2024-09-23 ENCOUNTER — APPOINTMENT (OUTPATIENT)
Dept: PHYSICAL THERAPY | Facility: CLINIC | Age: 49
End: 2024-09-23
Payer: MEDICARE

## 2024-09-23 DIAGNOSIS — Z96.642 S/P TOTAL LEFT HIP ARTHROPLASTY: Primary | ICD-10-CM

## 2024-09-23 NOTE — PROGRESS NOTES
"Physical Therapy    Discharge Summary  Patient Name: Nati Arauz  MRN: [id number]  Today's Date: 9/23/2024    Discharge Summary: PT    Discharge Information: Date of discharge 9/25/24    Therapy Summary: Progressed    Discharge Status: Stable     Rehab Discharge Reason: Achieved all and/or the most significant goals(s)            Time Calculation  Start Time: 0116  Stop Time: 0146  Time Calculation (min): 30 min     PT Therapeutic Procedures Time Entry  Therapeutic Exercise Time Entry: 30                 Current Problem  1. Chronic left hip pain              Insurance:  [employer]  Approved 8v   Date range 8/29/24-10/4/24  Visit 7  Precautions   : Anterior hip precautions     Subjective   Subjective:  Referred By: Daniel Rivera  Pt reports that she is feeling good, but [place].  Pain  Pain Assessment: 0-10  0-10 (Numeric) Pain Score: 0 - No pain  Pain Location: Hip  Pain Orientation: Left    Objective       Left Hip AROM  * indicates Pain    EX 10  IR  30  ER  50  AB 50  AD 15     Left Hip Strength (0/5)  * indicates Pain  FL 5  EX 5  IR 4+  ER 4+  AB 5  AD 5      Treatments:  Justin 6''  CC walkouts F/B/R/L 5x ea L4          NT---  Bridges 2x10  SLR flex/abd 2x10 ea  SAQ 2# 20x*  Clamshells 2x10  LAQ 5# 2x10  Squats 2x10  HR/TR 1/2 roll x20 ea---  Standing hip 3-way 2x10 ea---  Step ups f/l 6\" 2x10 ea  Step overs 6 in 15x  Heel taps 4\" 2x10  Sidestepping RTB  4 steps x 6 laps  Marching on airex 15x  SLB airex 1'     OP EDUCATION:        Goals:   Increase ROM to WFL of L hip-MET  Increase Strength where deficits noted by 1/3 to 1 mm grade of L hip-MET  Ind w/ HEP to expedite progress and promote goal achievement. -MET   Improve Outcome score  for evidence of improved function.-MET  Return to PLOF -MET  Decrease pain to 2/10 or less- MET    Assessment:  Pt achieved all goals and is ready for DC from PT at this time.  Overall demonstrated good gains w/ therapy intervention.   "   Plan:  Pt to be Dc'd from PT this date and follow up w/ MD PRN or as scheduled.

## 2024-09-23 NOTE — PROGRESS NOTES
No chief complaint on file.      The patient is here for follow-up of their side: left hip arthroplasty.  The patient has mild hip pain.  The patient has no mechanical symptoms.  The patient is approximately  9 weeks  postop.    She notes some burning and tingling in both her feet at night, we discussed seeing our spine team however she is not interested in it at this time.  She is going to [place] next month to tie up some family loose ends after the death of a family member.    Physical examination:    Examination of the side: left hip  The incision is healing well  No erythema or warmth.  Range of motion: Forward Flexion: 120 Internal Rotation: 30 External Rotation: 30 Abduction 30  The Patient can single leg stand and actively abduct  Calf is soft, Homans negative  The patient has intact ankle dorsiflexion and plantarflexion.    Radiographs:  Left hip films to my interpretation show a total hip arthroplasty in satisfactory position.  The hip is reduced.  No fractures identified.  No obvious loosening or wear is appreciated.  There is some heterotopic ossification seen anterior to the greater trochanter.  There is incidental finding of a spinal bifida occulta seen      Impression:  Status post side: left total hip arthroplasty    Plan:  Discussed the continued importance of prophylactic dental antibiotics  Activities as tolerated  Follow up in  4 months  for recheck and x-rays, sooner if there is any problems  All questions answered

## 2024-09-24 ENCOUNTER — APPOINTMENT (OUTPATIENT)
Dept: ORTHOPEDIC SURGERY | Facility: CLINIC | Age: 49
End: 2024-09-24
Payer: MEDICARE

## 2024-09-24 ENCOUNTER — HOSPITAL ENCOUNTER (OUTPATIENT)
Dept: RADIOLOGY | Facility: HOSPITAL | Age: 49
Discharge: HOME | End: 2024-09-24
Payer: MEDICARE

## 2024-09-24 DIAGNOSIS — Z96.642 S/P TOTAL LEFT HIP ARTHROPLASTY: Primary | ICD-10-CM

## 2024-09-24 DIAGNOSIS — Z96.642 S/P TOTAL LEFT HIP ARTHROPLASTY: ICD-10-CM

## 2024-09-24 PROCEDURE — 73502 X-RAY EXAM HIP UNI 2-3 VIEWS: CPT | Mod: LT

## 2024-09-24 PROCEDURE — 73502 X-RAY EXAM HIP UNI 2-3 VIEWS: CPT | Mod: LEFT SIDE | Performed by: RADIOLOGY

## 2024-09-24 PROCEDURE — 99024 POSTOP FOLLOW-UP VISIT: CPT | Performed by: ORTHOPAEDIC SURGERY

## 2024-09-24 NOTE — LETTER
September 24, 2024     Patient: Nati Arauz   YOB: 1975   Date of Visit: 9/24/2024       To Whom It May Concern:    It is my medical opinion that Nati Arauz may return to work on 10-21-24 .    If you have any questions or concerns, please don't hesitate to call.         Sincerely,        Amando Spencer MD    CC: No Recipients

## 2024-09-25 ENCOUNTER — TREATMENT (OUTPATIENT)
Dept: PHYSICAL THERAPY | Facility: CLINIC | Age: 49
End: 2024-09-25
Payer: MEDICARE

## 2024-09-25 DIAGNOSIS — G89.29 CHRONIC LEFT HIP PAIN: Primary | ICD-10-CM

## 2024-09-25 DIAGNOSIS — M25.552 CHRONIC LEFT HIP PAIN: Primary | ICD-10-CM

## 2024-09-25 PROCEDURE — 97110 THERAPEUTIC EXERCISES: CPT | Mod: GP | Performed by: PHYSICAL THERAPIST

## 2024-09-25 ASSESSMENT — PAIN SCALES - GENERAL: PAINLEVEL_OUTOF10: 0 - NO PAIN

## 2024-09-25 ASSESSMENT — PAIN - FUNCTIONAL ASSESSMENT: PAIN_FUNCTIONAL_ASSESSMENT: 0-10

## 2024-10-08 ENCOUNTER — APPOINTMENT (OUTPATIENT)
Dept: ORTHOPEDIC SURGERY | Facility: CLINIC | Age: 49
End: 2024-10-08
Payer: MEDICARE

## 2024-12-11 ENCOUNTER — APPOINTMENT (OUTPATIENT)
Dept: PRIMARY CARE | Facility: CLINIC | Age: 49
End: 2024-12-11
Payer: MEDICARE

## 2024-12-11 VITALS
WEIGHT: 168.9 LBS | HEART RATE: 59 BPM | DIASTOLIC BLOOD PRESSURE: 76 MMHG | HEIGHT: 62 IN | TEMPERATURE: 97.5 F | SYSTOLIC BLOOD PRESSURE: 119 MMHG | BODY MASS INDEX: 31.08 KG/M2 | OXYGEN SATURATION: 98 %

## 2024-12-11 DIAGNOSIS — N63.23 BREAST LUMP ON LEFT SIDE AT 5 O'CLOCK POSITION: Primary | ICD-10-CM

## 2024-12-11 PROCEDURE — 3008F BODY MASS INDEX DOCD: CPT | Performed by: PHYSICIAN ASSISTANT

## 2024-12-11 PROCEDURE — 99213 OFFICE O/P EST LOW 20 MIN: CPT | Performed by: PHYSICIAN ASSISTANT

## 2024-12-11 PROCEDURE — 4004F PT TOBACCO SCREEN RCVD TLK: CPT | Performed by: PHYSICIAN ASSISTANT

## 2024-12-11 ASSESSMENT — PATIENT HEALTH QUESTIONNAIRE - PHQ9
SUM OF ALL RESPONSES TO PHQ9 QUESTIONS 1 AND 2: 0
2. FEELING DOWN, DEPRESSED OR HOPELESS: NOT AT ALL
1. LITTLE INTEREST OR PLEASURE IN DOING THINGS: NOT AT ALL

## 2024-12-11 NOTE — PROGRESS NOTES
"Subjective   Patient ID: Nati Arauz is a 49 y.o. female who presents for Breast Mass.    HPI     Pt c/o a lump on left breast x 3 months, no increased growth  Pt had mammogram done on 02/15/2024- no masses found   Pt denies any skin changes, nipple discharge, pain.   Under the breast @ 5 o clock   She found it by- feeling in shower.  Family Hx of brst CA: her maternal Aunt had brst CA, age 40's  No lumps or masses felt in arm pit.     FYI-  Pt also c/o having migraines with aura frequently not established with a neurology  Dx at age age 17, she gets them rarely and hasn't had them in the past. She does not get head pain, she does have vision changes. The last for 1 hr with brain fog. She is noticing them becoming more frequent lately. She takes 2 tylenol and a Mountain Dew and rests and it goes away. Hard to work through the vision changes.   Declines treatment or referral.           Review of Systems  Constitutional: Patient denies any fever, chills, loss of appetite, or unexplained weight loss.  Cardiovascular: Denies any chest pain, shortness of breath with exertion, tachycardia, palpitations.  Respiratory: Patient denies any cough, shortness of breath, or wheezing.  Skin:  Denies any rashes or skin lesions.    Objective   /76   Pulse 59   Temp 36.4 °C (97.5 °F) (Temporal)   Ht 1.575 m (5' 2\")   Wt 76.6 kg (168 lb 14.4 oz)   SpO2 98%   BMI 30.89 kg/m²     Physical Exam  Chest:          Comments: Hard, slightly mobile, nontender, nonerythematous 0.5 x 0.5 cm nodule        Assessment/Plan   Diagnoses and all orders for this visit:  1. Breast lump on left side at 5 o'clock position (Primary)  Patient noticed this breast lump about 3 months ago.  It has not changed in size, tenderness, shape.  She has family history of breast cancer in her maternal aunt.  Her most recent mammogram was done in February 2024.  A diagnostic mammogram and ultrasound were ordered today and we will call her with those " results as soon as they are reviewed.  - BI mammo left diagnostic tomosynthesis; Future  - BI US breast complete left; Future          Patient understands that should they have testing outside   facilities that we may not receive the results and was told to call us if they have not heard from our office within a week after testing.    [facility] uses voice recognition technology for dictations. Sometimes the software misinterprets words. Please take this into account when reading this.

## 2024-12-20 ENCOUNTER — HOSPITAL ENCOUNTER (OUTPATIENT)
Dept: RADIOLOGY | Facility: HOSPITAL | Age: 49
Discharge: HOME | End: 2024-12-20
Payer: MEDICARE

## 2024-12-20 VITALS — HEIGHT: 62 IN | WEIGHT: 163 LBS | BODY MASS INDEX: 30 KG/M2

## 2024-12-20 DIAGNOSIS — N63.23 BREAST LUMP ON LEFT SIDE AT 5 O'CLOCK POSITION: ICD-10-CM

## 2024-12-20 PROCEDURE — 77066 DX MAMMO INCL CAD BI: CPT

## 2024-12-20 PROCEDURE — 76642 ULTRASOUND BREAST LIMITED: CPT | Mod: LT

## 2024-12-20 PROCEDURE — 76982 USE 1ST TARGET LESION: CPT | Mod: LT

## 2024-12-23 DIAGNOSIS — N63.20 MASS OF LEFT BREAST, UNSPECIFIED QUADRANT: Primary | ICD-10-CM

## 2025-01-22 DIAGNOSIS — Z96.642 S/P TOTAL LEFT HIP ARTHROPLASTY: Primary | ICD-10-CM

## 2025-01-27 NOTE — PROGRESS NOTES
No chief complaint on file.      The patient is here for follow-up of their side: left hip arthroplasty.  The patient has no hip pain.  The patient has no mechanical symptoms.  The patient is approximately 6 months postop.    Physical examination:    Examination of the side: left hip  The incision is healing well  No erythema or warmth.  Range of motion: Forward Flexion: 120 Internal Rotation: 30 External Rotation: 30 Abduction 30  The Patient can single leg stand and actively abduct  Calf is soft, Homans negative  The patient has intact ankle dorsiflexion and plantarflexion.    Radiographs:  See dictated report        Impression:  Status post side: left total hip arthroplasty    Plan:  Discussed the continued importance of prophylactic dental antibiotics  Follow up in 6 months with XR   All questions answered

## 2025-01-28 ENCOUNTER — APPOINTMENT (OUTPATIENT)
Dept: ORTHOPEDIC SURGERY | Facility: CLINIC | Age: 50
End: 2025-01-28
Payer: MEDICARE

## 2025-01-28 ENCOUNTER — HOSPITAL ENCOUNTER (OUTPATIENT)
Dept: RADIOLOGY | Facility: HOSPITAL | Age: 50
Discharge: HOME | End: 2025-01-28
Payer: MEDICARE

## 2025-01-28 VITALS — BODY MASS INDEX: 29.26 KG/M2 | HEIGHT: 62 IN | WEIGHT: 159 LBS

## 2025-01-28 DIAGNOSIS — Z96.642 S/P TOTAL LEFT HIP ARTHROPLASTY: Primary | ICD-10-CM

## 2025-01-28 DIAGNOSIS — Z96.642 S/P TOTAL LEFT HIP ARTHROPLASTY: ICD-10-CM

## 2025-01-28 PROCEDURE — 99213 OFFICE O/P EST LOW 20 MIN: CPT | Performed by: ORTHOPAEDIC SURGERY

## 2025-01-28 PROCEDURE — 73502 X-RAY EXAM HIP UNI 2-3 VIEWS: CPT | Mod: LT

## 2025-01-28 PROCEDURE — 73502 X-RAY EXAM HIP UNI 2-3 VIEWS: CPT | Mod: LEFT SIDE | Performed by: RADIOLOGY

## 2025-01-28 PROCEDURE — 3008F BODY MASS INDEX DOCD: CPT | Performed by: ORTHOPAEDIC SURGERY

## 2025-02-26 ENCOUNTER — APPOINTMENT (OUTPATIENT)
Dept: PRIMARY CARE | Facility: CLINIC | Age: 50
End: 2025-02-26
Payer: MEDICARE

## 2025-02-26 VITALS
TEMPERATURE: 97.3 F | OXYGEN SATURATION: 100 % | WEIGHT: 166 LBS | HEIGHT: 62 IN | BODY MASS INDEX: 30.55 KG/M2 | RESPIRATION RATE: 18 BRPM | SYSTOLIC BLOOD PRESSURE: 112 MMHG | HEART RATE: 66 BPM | DIASTOLIC BLOOD PRESSURE: 76 MMHG

## 2025-02-26 DIAGNOSIS — M25.552 CHRONIC LEFT HIP PAIN: ICD-10-CM

## 2025-02-26 DIAGNOSIS — E78.2 MIXED HYPERLIPIDEMIA: ICD-10-CM

## 2025-02-26 DIAGNOSIS — Z00.00 ANNUAL PHYSICAL EXAM: Primary | ICD-10-CM

## 2025-02-26 DIAGNOSIS — F17.200 TOBACCO USE DISORDER: ICD-10-CM

## 2025-02-26 DIAGNOSIS — F41.1 GENERALIZED ANXIETY DISORDER: ICD-10-CM

## 2025-02-26 DIAGNOSIS — N63.20 MASS OF LEFT BREAST, UNSPECIFIED QUADRANT: ICD-10-CM

## 2025-02-26 DIAGNOSIS — F41.0 PANIC ATTACK: ICD-10-CM

## 2025-02-26 DIAGNOSIS — G89.29 CHRONIC LEFT HIP PAIN: ICD-10-CM

## 2025-02-26 PROBLEM — F17.290 VAPING NICOTINE DEPENDENCE, TOBACCO PRODUCT: Status: ACTIVE | Noted: 2025-02-26

## 2025-02-26 PROBLEM — F17.290 VAPING NICOTINE DEPENDENCE, TOBACCO PRODUCT: Status: RESOLVED | Noted: 2025-02-26 | Resolved: 2025-02-26

## 2025-02-26 PROCEDURE — 99214 OFFICE O/P EST MOD 30 MIN: CPT | Performed by: PHYSICIAN ASSISTANT

## 2025-02-26 PROCEDURE — 99396 PREV VISIT EST AGE 40-64: CPT | Performed by: PHYSICIAN ASSISTANT

## 2025-02-26 PROCEDURE — 3008F BODY MASS INDEX DOCD: CPT | Performed by: PHYSICIAN ASSISTANT

## 2025-02-26 PROCEDURE — 4004F PT TOBACCO SCREEN RCVD TLK: CPT | Performed by: PHYSICIAN ASSISTANT

## 2025-02-26 RX ORDER — CLONAZEPAM 0.5 MG/1
TABLET ORAL
Qty: 14 TABLET | Refills: 0 | Status: SHIPPED | OUTPATIENT
Start: 2025-02-26

## 2025-02-26 RX ORDER — HYDROCHLOROTHIAZIDE 25 MG/1
25 TABLET ORAL DAILY PRN
COMMUNITY

## 2025-02-26 RX ORDER — SERTRALINE HYDROCHLORIDE 50 MG/1
50 TABLET, FILM COATED ORAL DAILY
Qty: 30 TABLET | Refills: 1 | Status: SHIPPED | OUTPATIENT
Start: 2025-02-26 | End: 2025-04-27

## 2025-02-26 ASSESSMENT — ANXIETY QUESTIONNAIRES
5. BEING SO RESTLESS THAT IT IS HARD TO SIT STILL: NOT AT ALL
3. WORRYING TOO MUCH ABOUT DIFFERENT THINGS: SEVERAL DAYS
IF YOU CHECKED OFF ANY PROBLEMS ON THIS QUESTIONNAIRE, HOW DIFFICULT HAVE THESE PROBLEMS MADE IT FOR YOU TO DO YOUR WORK, TAKE CARE OF THINGS AT HOME, OR GET ALONG WITH OTHER PEOPLE: NOT DIFFICULT AT ALL
2. NOT BEING ABLE TO STOP OR CONTROL WORRYING: NOT AT ALL
7. FEELING AFRAID AS IF SOMETHING AWFUL MIGHT HAPPEN: NOT AT ALL
1. FEELING NERVOUS, ANXIOUS, OR ON EDGE: SEVERAL DAYS
4. TROUBLE RELAXING: SEVERAL DAYS
6. BECOMING EASILY ANNOYED OR IRRITABLE: SEVERAL DAYS
GAD7 TOTAL SCORE: 4

## 2025-02-26 ASSESSMENT — ENCOUNTER SYMPTOMS
FEVER: 0
UNEXPECTED WEIGHT CHANGE: 0
DIARRHEA: 0
HEADACHES: 1
NECK PAIN: 1
NAUSEA: 0
FATIGUE: 0
WEAKNESS: 0
APPETITE CHANGE: 0
VOMITING: 0
CONSTIPATION: 0
NERVOUS/ANXIOUS: 1
HALLUCINATIONS: 0
CONFUSION: 0
DYSURIA: 0
SHORTNESS OF BREATH: 0
LIGHT-HEADEDNESS: 0

## 2025-02-26 ASSESSMENT — PATIENT HEALTH QUESTIONNAIRE - PHQ9
2. FEELING DOWN, DEPRESSED OR HOPELESS: NOT AT ALL
9. THOUGHTS THAT YOU WOULD BE BETTER OFF DEAD, OR OF HURTING YOURSELF: NOT AT ALL
4. FEELING TIRED OR HAVING LITTLE ENERGY: SEVERAL DAYS
8. MOVING OR SPEAKING SO SLOWLY THAT OTHER PEOPLE COULD HAVE NOTICED. OR THE OPPOSITE, BEING SO FIGETY OR RESTLESS THAT YOU HAVE BEEN MOVING AROUND A LOT MORE THAN USUAL: NOT AT ALL
10. IF YOU CHECKED OFF ANY PROBLEMS, HOW DIFFICULT HAVE THESE PROBLEMS MADE IT FOR YOU TO DO YOUR WORK, TAKE CARE OF THINGS AT HOME, OR GET ALONG WITH OTHER PEOPLE: NOT DIFFICULT AT ALL
5. POOR APPETITE OR OVEREATING: NOT AT ALL
7. TROUBLE CONCENTRATING ON THINGS, SUCH AS READING THE NEWSPAPER OR WATCHING TELEVISION: NOT AT ALL
SUM OF ALL RESPONSES TO PHQ9 QUESTIONS 1 AND 2: 0
1. LITTLE INTEREST OR PLEASURE IN DOING THINGS: NOT AT ALL
6. FEELING BAD ABOUT YOURSELF - OR THAT YOU ARE A FAILURE OR HAVE LET YOURSELF OR YOUR FAMILY DOWN: NOT AT ALL
SUM OF ALL RESPONSES TO PHQ QUESTIONS 1-9: 2
3. TROUBLE FALLING OR STAYING ASLEEP OR SLEEPING TOO MUCH: SEVERAL DAYS

## 2025-02-26 NOTE — ASSESSMENT & PLAN NOTE
Pt has cut back to 1/3 pack a day to 1/4 pack. Pt would like to quit eventually, but is not in the mental state to quit at this time. Counseling given.

## 2025-02-26 NOTE — ASSESSMENT & PLAN NOTE
Referred pt to breast surgeon to potential remove breast cyst. US and diagnostic mammogram performed in 12/2024.

## 2025-02-26 NOTE — PROGRESS NOTES
Subjective   Patient ID: Nati Arauz is a 49 y.o. female who presents for Anxiety (Pt here today for a follow up for anxiety and was on Klonopin 0.5mg as needed years ago. But wants to discuss getting on a medication. Pt has Hydroxyzine as needed. ), Migraine (Pt gets migraines and can tell its coming on when words get blurry and she see's halos in eyes. Has been getting more frequently and can usually work through them but not lately. Uses Motrin 800mg when its really bad.), Breast Mass (Pt states lump is still there from when she seen Jonna back in Dec; had an US and Mamm and they told her to follow up in 6 months. But now its hurting and its squishy in middle not hard and bigger in size. Its locate under left breast where bra sits. ), and Bladder Problem (Pt has been having bladder leakage off/on. ).    HPI   - Lives at home in [place] with , father, son and daughter  - Employment - LPN works night shift     Preventive:     - Diet: snacks a lot, sandwiches/chicken pot pie, cooks at home  - Exercise: active at work  - Tobacco: active smoker, smokes 1 pack over 4 days, been smoking for 15 years   - THC/cannabis: none  - Illicit drugs: none  - Alcohol: occasionally  - Sexual hx: yes  - Mood: panic attacks at time  - Labs: 10/2023 routine labs  - Colon CA: 2021 at [facility]  - Mamm: screening in 2/16/2024, and diagnostic in 12/2024   - PAP: hysterectomy   - DEXA: no FH, father- hip fracture  - Shingrix: DUE, for second dose, will offer at visit in 3months  - Pneumovax/ Prevnar: 2016  - Td: DUE, declined     - Dentist visits: DUE  - Eye exams: UTD, wears bifocals    Anxiety:  - Onset: ongoing since 2003  - Clinical course: more restless/anxious  - Medication hx: On Klonapin in the past  - Current stressors: father staying with her, dx with Lewy Body dementia, has episodes of hallucinations lasting 2-3 days, caring for father (on O2 and tube feed)  - Coping strategies: Motor cycle riding club support each other  "  - Panic attacks: yes, starting in 2003, doesn't get them very often  - Ever seen counselor:  yes, in the past (not interested at this time)    Headache:   - Onset: off and on since age 19 yo  - Location: temporal  - Character: more frequent than normal, 3 times every 2 weeks, lasting 30-45 min  - Severity: can usu work through it, 3/10  - Associated sxs: reports visual disturbances  - Aggravated by: heat and stress  - Txs tried: 800 mg of ibuprofen and mountain dew  - Denies photophobia and phonophobia  - Denies N/V      Review of Systems   Constitutional:  Negative for appetite change, fatigue, fever and unexpected weight change.   HENT:  Negative for congestion.    Respiratory:  Negative for shortness of breath.    Cardiovascular:  Positive for leg swelling. Negative for chest pain.   Gastrointestinal:  Negative for constipation, diarrhea, nausea and vomiting.   Genitourinary:  Negative for dyspareunia and dysuria.   Musculoskeletal:  Positive for neck pain (associtated with headaches).   Neurological:  Positive for headaches. Negative for weakness and light-headedness.   Psychiatric/Behavioral:  Negative for confusion, hallucinations, self-injury and suicidal ideas. The patient is nervous/anxious.        Objective   /76   Pulse 66   Temp 36.3 °C (97.3 °F)   Resp 18   Ht 1.575 m (5' 2\")   Wt 75.3 kg (166 lb)   SpO2 100%   BMI 30.36 kg/m²     Physical Exam  Constitutional:       Appearance: Normal appearance.   HENT:      Head: Normocephalic.      Right Ear: Tympanic membrane, ear canal and external ear normal.      Left Ear: Tympanic membrane, ear canal and external ear normal.      Nose: Nose normal. No congestion.      Mouth/Throat:      Mouth: Mucous membranes are moist.      Pharynx: Oropharynx is clear. No oropharyngeal exudate or posterior oropharyngeal erythema.   Eyes:      Conjunctiva/sclera: Conjunctivae normal.   Cardiovascular:      Rate and Rhythm: Normal rate and regular rhythm.      " Heart sounds: Normal heart sounds. No murmur heard.  Pulmonary:      Effort: Pulmonary effort is normal. No respiratory distress.      Breath sounds: Normal breath sounds. No wheezing or rales.   Musculoskeletal:         General: No tenderness.      Right lower le+ Pitting Edema present.      Left lower le+ Pitting Edema present.   Skin:     General: Skin is warm and dry.      Coloration: Skin is not jaundiced or pale.   Neurological:      General: No focal deficit present.      Mental Status: She is alert and oriented to person, place, and time.      Gait: Gait normal.   Psychiatric:         Mood and Affect: Mood is anxious.         Behavior: Behavior normal.         Thought Content: Thought content normal.         Assessment/Plan     Problem List Items Addressed This Visit       Generalized anxiety disorder    Overview     - Current med: hydroxyzine prn (mostly used for itching)  - Historical meds: Ativan (made hr more anxious), Klonopin 0.5 mg prn, Paxil (significant weight gain), welbutrin working well for depression         Current Assessment & Plan     - Stressors: pts father currently staying with her; he has Lewy Body Dementia so she is often caring for him.  - Prescribed Zoloft 50 mg and Klonopin 0.5 mg (short course) PRN for panic attacks. Discussed adverse effects of BZDs including drowsiness and potential for low moods. Discussed avoiding alcohol with use, avoiding driving, and potential addictive effects. Discussed Zoloft adverse effects including sexual dysfunction, insomnia, GI upset. Counseled pt that she will hopefully see an improvement in mood in 4-6 weeks.          Relevant Medications    sertraline (Zoloft) 50 mg tablet    Mixed hyperlipidemia    Overview     - Most recent labs: Total chol 221, , HDL 56.0, trigs 110 (24)         Annual physical exam - Primary    Overview     - Lives [place] with  (Zeke) and two of her kids (Bharati 21yo  and Eulogio 17yo) and 's  dad (here from [place]). They have 5 kids total. Often taking care of her grandchildren   - Works as a nurse at [employer]   - Tdap 5/18/18 - next due 5/2028         Current Assessment & Plan     PREVENTIVE CARE SCREENING:  - Labs:    - Cologuard/ Colonoscopy: N/A  Vaccines:   - Shingles Vaccine (start at 50): received 1st dose, second dose DUE  - Tetanus (q10yrs): due in 5/2028  Women's health:   - PAP: hysterectomy  - Mammogram: screening in 2/16/2024, and diagnostic in 12/2024  Lifestyle Modification:  - Discussed DIET -   limit snacks, processed foods, sugary and greasy foods, fast foods. Increase healthy alternatives, whole grains, fruits vegetables.  - Encouraged to take daily multivitamin.    - Discussed EXERCISE -   Recommended weight training for bone health and 30 minutes of cardiovascular exercise 5-7 days a week.  - Encouraged pt to get yearly eye and dental exams          Relevant Orders    CBC    Comprehensive Metabolic Panel    Hemoglobin A1C    Lipid Panel    Tobacco use disorder    Current Assessment & Plan     Pt has cut back to 1/3 pack a day to 1/4 pack. Pt would like to quit eventually, but is not in the mental state to quit at this time. Counseling given.          Chronic left hip pain    Current Assessment & Plan     Pt had a Total Hip Replacement         Mass of left breast    Current Assessment & Plan     Referred pt to breast surgeon to potential remove breast cyst. US and diagnostic mammogram performed in 12/2024.         Relevant Orders    Referral to Breast Surgery     Other Visit Diagnoses       Panic attack        Relevant Medications    clonazePAM (KlonoPIN) 0.5 mg tablet            I was present with the PA student (Nimisha Montejo from [employer]) who participated in the documentation of this note. I have personally seen and re-examined the patient and performed the medical decision-making components (assessment and plan of care). I have reviewed the PA student documentation and  verified the findings in the note as written with additions or exceptions as stated in the body of this note.    Monse Clements PA-C

## 2025-02-26 NOTE — ASSESSMENT & PLAN NOTE
- Stressors: pts father currently staying with her; he has Lewy Body Dementia so she is often caring for him.  - Prescribed Zoloft 50 mg and Klonopin 0.5 mg (short course) PRN for panic attacks. Discussed adverse effects of BZDs including drowsiness and potential for low moods. Discussed avoiding alcohol with use, avoiding driving, and potential addictive effects. Discussed Zoloft adverse effects including sexual dysfunction, insomnia, GI upset. Counseled pt that she will hopefully see an improvement in mood in 4-6 weeks.

## 2025-02-26 NOTE — ASSESSMENT & PLAN NOTE
PREVENTIVE CARE SCREENING:  - Labs:    - Cologuard/ Colonoscopy: N/A  Vaccines:   - Shingles Vaccine (start at 50): received 1st dose, second dose DUE  - Tetanus (q10yrs): due in 5/2028  Women's health:   - PAP: hysterectomy  - Mammogram: screening in 2/16/2024, and diagnostic in 12/2024  Lifestyle Modification:  - Discussed DIET -   limit snacks, processed foods, sugary and greasy foods, fast foods. Increase healthy alternatives, whole grains, fruits vegetables.  - Encouraged to take daily multivitamin.    - Discussed EXERCISE -   Recommended weight training for bone health and 30 minutes of cardiovascular exercise 5-7 days a week.  - Encouraged pt to get yearly eye and dental exams

## 2025-03-10 NOTE — PROGRESS NOTES
BREAST SURGERY NEW PATIENT    Assessment/Plan     Probably benign dermal based mass in the left breast at 5:00 10cmFN  ***    Subjective   Nati Arauz is a 49 y.o. female who presents today for surgical evaluation for a palpable mass in the left breast.          Menarche: ***  AFLB: ***  Menopause: ***  HRT: ***  Previous biopsies: {YES wildcard/NO:60}  Previous breast surgery: {YES wildcard/NO:60}  Prior breast cancer: {YES wildcard/NO:60}    Family history: ***    Review of Systems   Constitutional symptoms: Denies generalized fatigue.  Denies weight change, fevers/chills, difficulty sleeping   Eyes: Denies double vision, glaucoma, cataracts.  Ear/nose/throat/mouth: Denies hearing changes, sore throat, sinus problems.  Cardiovascular: No chest pain.  Denies irregular heartbeat.  Denies ankle swelling.  Respiratory: No wheezing, cough, or shortness of breath.  Gastrointestinal: No abdominal pain,  No nausea/vomiting.  No indigestion/heartburn.  No change in bowel habits.  No constipation or diarrhea.   Genitourinary: No urinary incontinence.  No urinary frequency.  No painful urination.  Musculoskeletal: No bone pain, no muscle pain, no joint pain.   Integumentary: No rash. No masses.  No changes in moles.  No easy bruising.  Neurological: No headaches.  No tremors. No numbness/tingling.  Psychiatric: No anxiety. No depression.  Endocrine: No excessive thirst.  Not too hot or too cold.  Not tired or fatigued.    Hematological/lymphatic: No swollen glands or blood clotting problems.  No bruising.    Objective   Physical Exam  General: Alert and oriented x 3.  Mood and affect are appropriate.  HEENT: EOMI, PERRLA.   Neck: supple, no masses, no cervical adenopathy.  Cardiovascular: no lower extremity edema.  Pulmonary: breathing non labored on room air.  GI: Abdomen soft, no masses. No hepatomegaly or splenomegaly.  Lymph nodes: No supraclavicular or axillary adenopathy bilaterally.  Musculoskeletal: Full range  of motion in the upper extremities bilaterally.  Neuro: denies dizziness, tremors    Breasts: The breasts were examined in both the seated and supine positions.    RIGHT: The nipple is everted without nipple discharge.  There are no skin changes, skin thickening, or dimpling. There are no masses palpated in the RIGHT breast.   LEFT: The nipple is everted without nipple discharge.  There are no skin changes, skin thickening, or dimpling. There are no masses palpated in the LEFT breast.     Radiology review: All images and reports were personally reviewed.         Va Fitzpatrick, DO

## 2025-03-11 ENCOUNTER — APPOINTMENT (OUTPATIENT)
Dept: SURGICAL ONCOLOGY | Facility: CLINIC | Age: 50
End: 2025-03-11
Payer: MEDICARE

## 2025-03-18 ENCOUNTER — APPOINTMENT (OUTPATIENT)
Dept: SURGICAL ONCOLOGY | Facility: CLINIC | Age: 50
End: 2025-03-18
Payer: MEDICARE

## 2025-06-18 ENCOUNTER — APPOINTMENT (OUTPATIENT)
Dept: PRIMARY CARE | Facility: CLINIC | Age: 50
End: 2025-06-18
Payer: MEDICARE

## 2025-06-23 ENCOUNTER — HOSPITAL ENCOUNTER (OUTPATIENT)
Dept: RADIOLOGY | Facility: HOSPITAL | Age: 50
Discharge: HOME | End: 2025-06-23
Payer: MEDICARE

## 2025-06-23 VITALS — BODY MASS INDEX: 30.73 KG/M2 | WEIGHT: 168 LBS

## 2025-06-23 DIAGNOSIS — N63.20 MASS OF LEFT BREAST, UNSPECIFIED QUADRANT: ICD-10-CM

## 2025-06-23 PROCEDURE — 76642 ULTRASOUND BREAST LIMITED: CPT | Mod: LT

## 2025-06-23 PROCEDURE — 76642 ULTRASOUND BREAST LIMITED: CPT | Mod: LEFT SIDE | Performed by: RADIOLOGY

## 2025-07-29 DIAGNOSIS — Z96.642 S/P TOTAL LEFT HIP ARTHROPLASTY: Primary | ICD-10-CM

## 2025-08-05 ENCOUNTER — APPOINTMENT (OUTPATIENT)
Dept: ORTHOPEDIC SURGERY | Facility: CLINIC | Age: 50
End: 2025-08-05
Payer: MEDICARE

## 2025-08-05 ENCOUNTER — HOSPITAL ENCOUNTER (OUTPATIENT)
Dept: RADIOLOGY | Facility: HOSPITAL | Age: 50
Discharge: HOME | End: 2025-08-05
Payer: MEDICARE

## 2025-08-05 DIAGNOSIS — Z96.642 S/P TOTAL LEFT HIP ARTHROPLASTY: ICD-10-CM

## 2025-08-05 DIAGNOSIS — M70.62 GREATER TROCHANTERIC BURSITIS OF LEFT HIP: ICD-10-CM

## 2025-08-05 DIAGNOSIS — Z96.642 S/P TOTAL LEFT HIP ARTHROPLASTY: Primary | ICD-10-CM

## 2025-08-05 PROCEDURE — 99213 OFFICE O/P EST LOW 20 MIN: CPT | Performed by: ORTHOPAEDIC SURGERY

## 2025-08-05 PROCEDURE — 73502 X-RAY EXAM HIP UNI 2-3 VIEWS: CPT | Mod: LT

## 2025-08-05 PROCEDURE — 73502 X-RAY EXAM HIP UNI 2-3 VIEWS: CPT | Mod: LEFT SIDE | Performed by: RADIOLOGY

## 2025-08-05 NOTE — PROGRESS NOTES
No chief complaint on file.      The patient is here for follow-up of their side: left hip arthroplasty.  The patient has mild hip pain.  The patient has no mechanical symptoms.  The patient is approximately 1 year postop.    Physical examination:    Examination of the side: left hip  Range of motion: Forward Flexion: 120 Internal Rotation: 30 External Rotation: 30 Abduction 30  The Patient can single leg stand and actively abduct  Calf is soft, Homans negative  The patient has intact ankle dorsiflexion and plantarflexion.  Mild tenderness to palpation of the greater trochanter  She notes numbness in her bilateral feet this is a chronic issue    Radiographs:  See dictated report        Impression:  Status post side: left total hip arthroplasty  Left hip greater trochanteric bursitis    Plan:  Stretches shown in the office today  Discussed the continued importance of prophylactic dental antibiotics  Follow up in 1 year for recheck and x-rays, sooner if there is any problems  All questions answered

## (undated) DEVICE — SUTURE, ETHIBOND, XTRA, 30 IN, 1, OS-4, GREEN

## (undated) DEVICE — DRESSING, MEPILEX BORDER, POST-OP AG, 4 X 12 IN

## (undated) DEVICE — APPLICATOR, CHLORAPREP, W/ORANGE TINT, 26ML

## (undated) DEVICE — SOLUTION, INJECTION, USP, SODIUM CHLORIDE 0.9%, .9, NACL, 1000 ML, BAG

## (undated) DEVICE — POSITIONER, CRADLE, HEAD, MEDC, FOAM SLOTTED

## (undated) DEVICE — DRAPE, HIP, TIBURON  HD, W/POUCHES

## (undated) DEVICE — HOOD, SURGICAL, FLYTE, T7

## (undated) DEVICE — TRAY, MINOR, SINGLE BASIN, STERILE

## (undated) DEVICE — Device

## (undated) DEVICE — DRAPE, SHEET, U, W/ADHESIVE STRIP, IMPERVIOUS, 60 X 70 IN, DISPOSABLE, LF, STERILE

## (undated) DEVICE — TOWEL PACK, STERILE, 16X24, XRAY DETECTABLE, BLUE, 4/PK

## (undated) DEVICE — ADHESIVE, SKIN, LIQUIBAND EXCEED

## (undated) DEVICE — DRAPE, SHEET, THREE QUARTER, FAN FOLD, 57 X 77 IN

## (undated) DEVICE — SUTURE, MONOCRYL, 4-0, 27 IN, PS-2, UNDYED

## (undated) DEVICE — MAT, FLOOR, STANDARD FLUID BARRIER, 32X44, GREEN

## (undated) DEVICE — PROTECTOR, NERVE, ULNAR, PINK

## (undated) DEVICE — GOWN, SURGICAL, ROYAL SILK, LG, STERILE

## (undated) DEVICE — DRILL BIT, JSA, 2.9MM, SHORT, STERILE

## (undated) DEVICE — SYRINGE, 50 CC, LUER LOCK

## (undated) DEVICE — STRAP, ARM BOARD, 32 X 1.5

## (undated) DEVICE — STRAP, VELCRO, BODY, 4 X 60IN, NS

## (undated) DEVICE — BLADE, SAW, SAGITTAL, 21 X 84.5 X 0.89 MM, STAINLESS STEEL, STERILE

## (undated) DEVICE — SUTURE, ETHIBOND XTRA, 1, 30 IN, CTX, LF

## (undated) DEVICE — GLOVE, SURGICAL, PROTEXIS PI , 8.5, PF, LF

## (undated) DEVICE — BLADE, GEN COATED 2.75, LF

## (undated) DEVICE — GOWN, SURGICAL, ROYAL SILK, XXL, STERILE

## (undated) DEVICE — SUTURE, VICRYL, 1, 36 IN, V-34, VIOLET

## (undated) DEVICE — BATH BLANKET STERILE

## (undated) DEVICE — NEEDLE, SPINAL, QUINCKE, 18 G X 3.5 IN, PINK HUB

## (undated) DEVICE — MANIFOLD, 4 PORT NEPTUNE STANDARD

## (undated) DEVICE — DRAPE, INCISE, ANTIMICROBIAL, IOBAN 2, LARGE, 17 X 23 IN, DISPOSABLE, STERILE

## (undated) DEVICE — SOLUTION, IRRIGATION, USP, SODIUM CHLORIDE 0.9%, 3000 ML

## (undated) DEVICE — ELECTRODE, ELECTROSURGICAL, BLADE, EXTENDED

## (undated) DEVICE — GLOVE, SURGICAL, PROTEXIS PI BLUE W/NEUTHERA, 7.5, PF, LF

## (undated) DEVICE — GLOVE, SURGICAL, PROTEXIS PI , 7.5, PF, LF

## (undated) DEVICE — TUBING, IRRIGATION, HIGH FLOW, HAND PIECE SET

## (undated) DEVICE — DRESSING, GAUZE, SPONGE, 8 PLY, CURITY, 4 X 4, LF

## (undated) DEVICE — WOUND SYSTEM, DEBRIDEMENT & CLEANING, O.R DUOPAK